# Patient Record
Sex: FEMALE | Race: WHITE | Employment: OTHER | URBAN - METROPOLITAN AREA
[De-identification: names, ages, dates, MRNs, and addresses within clinical notes are randomized per-mention and may not be internally consistent; named-entity substitution may affect disease eponyms.]

---

## 2017-05-10 PROBLEM — I10 ESSENTIAL HYPERTENSION: Status: ACTIVE | Noted: 2017-05-10

## 2017-05-24 ENCOUNTER — HOSPITAL ENCOUNTER (OUTPATIENT)
Dept: CT IMAGING | Age: 72
Discharge: HOME | End: 2017-05-24
Attending: NURSE PRACTITIONER
Payer: MEDICARE

## 2017-05-24 DIAGNOSIS — R91.1 PULMONARY NODULE: ICD-10-CM

## 2017-05-24 PROCEDURE — 71250 CT THORAX DX C-: CPT

## 2017-07-20 ENCOUNTER — HOSPITAL ENCOUNTER (OUTPATIENT)
Age: 72
Setting detail: OUTPATIENT SURGERY
Discharge: HOME | End: 2017-07-20
Attending: INTERNAL MEDICINE | Admitting: INTERNAL MEDICINE
Payer: MEDICARE

## 2017-07-20 ENCOUNTER — APPOINTMENT (OUTPATIENT)
Dept: GENERAL RADIOLOGY | Age: 72
End: 2017-07-20
Attending: INTERNAL MEDICINE
Payer: MEDICARE

## 2017-07-20 ENCOUNTER — APPOINTMENT (OUTPATIENT)
Dept: CT IMAGING | Age: 72
End: 2017-07-20
Attending: INTERNAL MEDICINE
Payer: MEDICARE

## 2017-07-20 VITALS
RESPIRATION RATE: 16 BRPM | OXYGEN SATURATION: 94 % | DIASTOLIC BLOOD PRESSURE: 73 MMHG | HEART RATE: 65 BPM | SYSTOLIC BLOOD PRESSURE: 134 MMHG

## 2017-07-20 DIAGNOSIS — R91.8 LUNG MASS: ICD-10-CM

## 2017-07-20 DIAGNOSIS — Z87.891 HISTORY OF TOBACCO ABUSE: ICD-10-CM

## 2017-07-20 DIAGNOSIS — C34.11 CANCER OF UPPER LOBE OF RIGHT LUNG (HCC): ICD-10-CM

## 2017-07-20 PROCEDURE — 31654 BRONCH EBUS IVNTJ PERPH LES: CPT | Performed by: INTERNAL MEDICINE

## 2017-07-20 PROCEDURE — 88305 TISSUE EXAM BY PATHOLOGIST: CPT | Performed by: INTERNAL MEDICINE

## 2017-07-20 PROCEDURE — 76040000026: Performed by: INTERNAL MEDICINE

## 2017-07-20 PROCEDURE — 88112 CYTOPATH CELL ENHANCE TECH: CPT | Performed by: INTERNAL MEDICINE

## 2017-07-20 PROCEDURE — 99152 MOD SED SAME PHYS/QHP 5/>YRS: CPT | Performed by: INTERNAL MEDICINE

## 2017-07-20 PROCEDURE — 99204 OFFICE O/P NEW MOD 45 MIN: CPT | Performed by: INTERNAL MEDICINE

## 2017-07-20 PROCEDURE — 74011250636 HC RX REV CODE- 250/636: Performed by: INTERNAL MEDICINE

## 2017-07-20 PROCEDURE — 77030028571 HC CATH ENDOBRNCH DISP BIOP KT SPMD -G1: Performed by: INTERNAL MEDICINE

## 2017-07-20 PROCEDURE — 77030021922 HC FCPS BIOP SD DISP SPDM -D: Performed by: INTERNAL MEDICINE

## 2017-07-20 PROCEDURE — 77030031404 HC PTCH SENS SD DISP SPDM -A: Performed by: INTERNAL MEDICINE

## 2017-07-20 PROCEDURE — 31624 DX BRONCHOSCOPE/LAVAGE: CPT | Performed by: INTERNAL MEDICINE

## 2017-07-20 PROCEDURE — 77030009046 HC CATH BRNCH BLLN OCOA -B: Performed by: INTERNAL MEDICINE

## 2017-07-20 PROCEDURE — 74011000250 HC RX REV CODE- 250: Performed by: INTERNAL MEDICINE

## 2017-07-20 PROCEDURE — 77030003406 HC NDL ASPIR BIOP OCOA -C: Performed by: INTERNAL MEDICINE

## 2017-07-20 PROCEDURE — 71250 CT THORAX DX C-: CPT

## 2017-07-20 PROCEDURE — 88177 CYTP FNA EVAL EA ADDL: CPT | Performed by: INTERNAL MEDICINE

## 2017-07-20 PROCEDURE — 74011250636 HC RX REV CODE- 250/636

## 2017-07-20 PROCEDURE — 99153 MOD SED SAME PHYS/QHP EA: CPT | Performed by: INTERNAL MEDICINE

## 2017-07-20 PROCEDURE — 31628 BRONCHOSCOPY/LUNG BX EACH: CPT | Performed by: INTERNAL MEDICINE

## 2017-07-20 PROCEDURE — 77030012699 HC VLV SUC CNTRL OCOA -A: Performed by: INTERNAL MEDICINE

## 2017-07-20 PROCEDURE — 88173 CYTOPATH EVAL FNA REPORT: CPT | Performed by: INTERNAL MEDICINE

## 2017-07-20 PROCEDURE — 31627 NAVIGATIONAL BRONCHOSCOPY: CPT | Performed by: INTERNAL MEDICINE

## 2017-07-20 PROCEDURE — 88172 CYTP DX EVAL FNA 1ST EA SITE: CPT | Performed by: INTERNAL MEDICINE

## 2017-07-20 RX ORDER — SODIUM CHLORIDE 9 MG/ML
25 INJECTION, SOLUTION INTRAVENOUS CONTINUOUS
Status: DISCONTINUED | OUTPATIENT
Start: 2017-07-20 | End: 2017-07-20 | Stop reason: HOSPADM

## 2017-07-20 RX ORDER — FENTANYL CITRATE 50 UG/ML
50 INJECTION, SOLUTION INTRAMUSCULAR; INTRAVENOUS
Status: DISCONTINUED | OUTPATIENT
Start: 2017-07-20 | End: 2017-07-20 | Stop reason: HOSPADM

## 2017-07-20 RX ORDER — LIDOCAINE HYDROCHLORIDE 20 MG/ML
JELLY TOPICAL AS NEEDED
Status: DISCONTINUED | OUTPATIENT
Start: 2017-07-20 | End: 2017-07-20 | Stop reason: HOSPADM

## 2017-07-20 RX ORDER — LIDOCAINE HYDROCHLORIDE 40 MG/ML
SOLUTION TOPICAL AS NEEDED
Status: DISCONTINUED | OUTPATIENT
Start: 2017-07-20 | End: 2017-07-20 | Stop reason: HOSPADM

## 2017-07-20 RX ORDER — SODIUM CHLORIDE 0.9 % (FLUSH) 0.9 %
5-10 SYRINGE (ML) INJECTION EVERY 8 HOURS
Status: DISCONTINUED | OUTPATIENT
Start: 2017-07-20 | End: 2017-07-20 | Stop reason: HOSPADM

## 2017-07-20 RX ORDER — SODIUM CHLORIDE 0.9 % (FLUSH) 0.9 %
5-10 SYRINGE (ML) INJECTION AS NEEDED
Status: DISCONTINUED | OUTPATIENT
Start: 2017-07-20 | End: 2017-07-20 | Stop reason: HOSPADM

## 2017-07-20 RX ORDER — MIDAZOLAM HYDROCHLORIDE 1 MG/ML
.25-5 INJECTION, SOLUTION INTRAMUSCULAR; INTRAVENOUS
Status: DISCONTINUED | OUTPATIENT
Start: 2017-07-20 | End: 2017-07-20 | Stop reason: HOSPADM

## 2017-07-20 RX ADMIN — MIDAZOLAM HYDROCHLORIDE 1 MG: 1 INJECTION, SOLUTION INTRAMUSCULAR; INTRAVENOUS at 10:17

## 2017-07-20 RX ADMIN — FENTANYL CITRATE 50 MCG: 50 INJECTION, SOLUTION INTRAMUSCULAR; INTRAVENOUS at 10:47

## 2017-07-20 RX ADMIN — FENTANYL CITRATE 50 MCG: 50 INJECTION, SOLUTION INTRAMUSCULAR; INTRAVENOUS at 10:07

## 2017-07-20 RX ADMIN — LIDOCAINE HYDROCHLORIDE 7 ML: 40 SOLUTION TOPICAL at 10:14

## 2017-07-20 RX ADMIN — SODIUM CHLORIDE 25 ML/HR: 900 INJECTION, SOLUTION INTRAVENOUS at 10:09

## 2017-07-20 RX ADMIN — FENTANYL CITRATE 50 MCG: 50 INJECTION, SOLUTION INTRAMUSCULAR; INTRAVENOUS at 10:21

## 2017-07-20 RX ADMIN — MIDAZOLAM HYDROCHLORIDE 1 MG: 1 INJECTION, SOLUTION INTRAMUSCULAR; INTRAVENOUS at 10:11

## 2017-07-20 RX ADMIN — MIDAZOLAM HYDROCHLORIDE 2 MG: 1 INJECTION, SOLUTION INTRAMUSCULAR; INTRAVENOUS at 10:07

## 2017-07-20 RX ADMIN — FENTANYL CITRATE 50 MCG: 50 INJECTION, SOLUTION INTRAMUSCULAR; INTRAVENOUS at 10:10

## 2017-07-20 RX ADMIN — LIDOCAINE HYDROCHLORIDE 1 ML: 20 JELLY TOPICAL at 10:14

## 2017-07-20 RX ADMIN — FENTANYL CITRATE 50 MCG: 50 INJECTION, SOLUTION INTRAMUSCULAR; INTRAVENOUS at 10:16

## 2017-07-20 NOTE — H&P
HISTORY AND PHYSICAL      Antoni Bella    7/20/2017    Date of Admission:  7/20/2017    The patient's chart is reviewed and the patient is discussed with the staff. Subjective:     Patient is a 66 y.o.  female presents for a new patient evaluation for lung mass. She states for the last few months she has noticed increased TAO, particularly when walking uphill. She eventually informed her PCP who performed a CXR followed by CT chest. This revealed a 3.7 x 1.7 mass in the RUL. She reports some associated dry cough that she describes as a tickle in her throat. This is not productive and she denies any hemoptysis, fever, chills, chest pain, wheeze, night sweats, or unintentional weight loss. Her only history of cancer is a skin cancer removed from her forehead (she cannot remember which type). She is a brief former smoker, smoking about 1/2 ppd for 5 years and quit 20 years ago. Review of Systems  A comprehensive review of systems was negative except for that written in the HPI. Patient Active Problem List   Diagnosis Code    Prediabetes R73.03    Acquired hypothyroidism E03.9    Hypertension I10    Pure hypercholesterolemia E78.00    Urinary frequency R35.0    Generalized osteoarthrosis, involving multiple sites M15.9    Basal cell carcinoma C44.91    Fibromyalgia M79.7    Rheumatoid arthritis ([facility] [address] M06.9    Gastroesophageal reflux disease without esophagitis K21.9    Essential hypertension I10    Lung mass R91.8       Prior to Admission Medications   Prescriptions Last Dose Informant Patient Reported? Taking? ASCORBIC ACID/COLLAGEN HYDR (COLLAGEN PLUS VITAMIN C PO) 7/19/2017 at Unknown time  Yes Yes   Sig: Take 6 Tabs by mouth daily. BIOTIN PO 7/19/2017 at Unknown time  Yes Yes   Sig: Take  by mouth. 5,000mcg   CALCIUM CARBONATE/VITAMIN D3 (CALTRATE 600 + D PO) 7/19/2017 at Unknown time  Yes Yes   Sig: Take  by mouth.  With Magnesium and Zinc also DULoxetine (CYMBALTA) 60 mg capsule 7/19/2017 at Unknown time  No Yes   Sig: Take 1 Cap by mouth daily. GLUCOSAMINE HCL/CHONDR BIRD A NA (OSTEO BI-FLEX PO) 7/19/2017 at Unknown time  Yes Yes   Sig: Take  by mouth. LACTOBACILLUS ACIDOPHILUS (PROBIOTIC PO) 7/19/2017 at Unknown time  Yes Yes   Sig: Take  by mouth. OTHER 6/20/2017 at Unknown time  Yes Yes   Sig: Go Hermila Go powder taken once a day   celecoxib (CELEBREX) 200 mg capsule 7/19/2017 at Unknown time  No Yes   Sig: Take 1 Cap by mouth two (2) times a day. hydroCHLOROthiazide (HYDRODIURIL) 25 mg tablet 7/20/2017 at Unknown time  No Yes   Sig: Take 1 Tab by mouth daily. levothyroxine (SYNTHROID) 50 mcg tablet 7/19/2017 at Unknown time  No Yes   Sig: TAKE 1 TABLET EVERY DAY  BEFORE  BREAKFAST   losartan (COZAAR) 100 mg tablet 7/20/2017 at Unknown time  No Yes   Sig: TAKE ONE TABLET BY MOUTH EVERY DAY   multivitamins chew 7/19/2017 at Unknown time  Yes Yes   Sig: Take 1 Tab by mouth daily. omeprazole (PRILOSEC) 40 mg capsule 7/19/2017 at Unknown time  No Yes   Sig: Take 1 Cap by mouth daily. Indications: HEARTBURN   raNITIdine (ZANTAC) 300 mg tablet 7/19/2017 at Unknown time  No Yes   Sig: Take 1 Tab by mouth nightly. Indications: HEARTBURN   tolterodine ER (DETROL LA) 4 mg ER capsule 7/13/2017 at Unknown time  No Yes   Sig: Take 1 Cap by mouth daily. Indications: INCREASED URINARY FREQUENCY   verapamil ER (VERELAN) 180 mg CR capsule Unknown at Unknown time  No No   Sig: Take 1 Cap by mouth daily.  For Hypertension in addition to Metoprolol XL and Losartan      Facility-Administered Medications: None       Past Medical History:   Diagnosis Date    Basal cell carcinoma 2001    COPD     Esophageal reflux     Fibromyalgia     Gastroesophageal reflux disease without esophagitis 8/10/2016    Generalized osteoarthrosis, involving multiple sites     Hypertension     Obstructive chronic bronchitis without exacerbation (HCC)     Other abnormal glucose HgbA1C 5.9 in 5/13    Other and unspecified special symptom or syndrome, not elsewhere classified 2001    intestinal blockage    Pain in joint, multiple sites     Prediabetes     HgbA1C 5.9 in 5/13    Pure hypercholesterolemia     Unspecified hypothyroidism 5/13    Urinary frequency      Past Surgical History:   Procedure Laterality Date    BREAST SURGERY PROCEDURE UNLISTED  8/04    left breast cyst aspiration/stereotactic core biopsy, fibroadenoma (benign)    ENDOSCOPY, COLON, DIAGNOSTIC  8/08    HX ACL RECONSTRUCTION      ACL repair right knee    HX BREAST BIOPSY Left     HX BUNIONECTOMY      bilateral    HX CATARACT REMOVAL      bilateral    HX CYST REMOVAL      ganglion    HX ORTHOPAEDIC      bunion surgery-bilateral    HX ORTHOPAEDIC  11/4/2016    right hip    HX OTHER SURGICAL  2002    face lift    HX OTHER SURGICAL      varicose veins surgery x2    TOTAL KNEE ARTHROPLASTY  10/05    right     Social History     Social History    Marital status:      Spouse name: N/A    Number of children: N/A    Years of education: N/A     Occupational History    Not on file.      Social History Main Topics    Smoking status: Former Smoker     Packs/day: 0.50     Years: 5.00     Quit date: 1/1/1990    Smokeless tobacco: Never Used    Alcohol use 1.2 oz/week     2 Glasses of wine per week      Comment: light, drinks wine    Drug use: No    Sexual activity: Not on file     Other Topics Concern    Not on file     Social History Narrative     Family History   Problem Relation Age of Onset    Cancer Mother     Coronary Artery Disease Father      MI at 72    Cancer Father      lung    Heart Disease Father     Hypertension Sister     Arthritis-osteo Other     Breast Cancer Neg Hx      Allergies   Allergen Reactions    Lisinopril Cough       Current Facility-Administered Medications   Medication Dose Route Frequency    lidocaine (XYLOCAINE) 4 % (40 mg/mL) topical solution   Topical PRN  lidocaine (XYLOCAINE) 2 % jelly   Mucous Membrane PRN    promethazine (PHENERGAN) with saline injection 12.5 mg  12.5 mg IntraVENous ONCE    sodium chloride (NS) flush 5-10 mL  5-10 mL IntraVENous Q8H    sodium chloride (NS) flush 5-10 mL  5-10 mL IntraVENous PRN    0.9% sodium chloride infusion  25 mL/hr IntraVENous CONTINUOUS    midazolam (VERSED) injection 0.25-5 mg  0.25-5 mg IntraVENous Multiple    fentaNYL citrate (PF) injection 50 mcg  50 mcg IntraVENous Multiple           Objective:     Vitals:    07/20/17 0959   BP: 161/71   Pulse: 66   Resp: 15   SpO2: 99%       PHYSICAL EXAM     Constitutional:  the patient is well developed and in no acute distress  EENMT:  Sclera clear, pupils equal, oral mucosa moist  Respiratory: CTA B, no w/r/r  Cardiovascular:  RRR without M,G,R  Gastrointestinal: soft and non-tender; with positive bowel sounds. Musculoskeletal: warm without cyanosis. There is no lower leg edema. Skin:  no jaundice or rashes, no wounds   Neurologic: no gross neuro deficits     Psychiatric:  alert and oriented x ppt    CT Chest:  7/20/17      No results for input(s): WBC, HGB, HCT, PLT, INR, HGBEXT, HCTEXT, PLTEXT, HGBEXT, HCTEXT, PLTEXT in the last 72 hours. No lab exists for component: INREXT, INREXT  No results for input(s): NA, K, CL, GLU, CO2, BUN, CREA, MG, PHOS, CA, TROIQ, ALB, TBIL, TBILI, GPT, ALT, SGOT, BNPP in the last 72 hours. No lab exists for component: TROIP  No results for input(s): PH, PCO2, PO2, HCO3 in the last 72 hours. No results for input(s): LCAD, LAC in the last 72 hours. Assessment:  (Medical Decision Making)     Hospital Problems  Date Reviewed: 7/10/2017          Codes Class Noted POA    Lung mass ICD-10-CM: R91.8  ICD-9-CM: 786.6  7/20/2017 Unknown            New patient here for evaluation of lung mass. Seen for the first time in the [address] [place] [address] lab. Discussed with her that this could represent benign process but also malignancy.  No old results for comparison. Given its size, it warrants attempt at biopsy. Plan:  (Medical Decision Making)     --Will proceed with planned biopsy via navigation bronchoscopy  -patient will need follow up in our office with PFT's as well. More than 50% of the time documented was spent in face-to-face contact with the patient and in the care of the patient on the floor/unit where the patient is located.     Annemarie Saleem MD

## 2017-07-20 NOTE — DISCHARGE INSTRUCTIONS
RESPIRATORY CARE - BRONCHOSCOPY - DISCHARGE INSTRUCTIONS      You received a lot of numbing medication for your throat and nose, and you also received medication to make you sleepy during your procedure. Because of this and because the bronchoscopy may have irritated your airways, we ask that you follow these directions:    1. Do not eat or drink until  1 pm .   After that, you may have what you please. You may want to try some liquids first, because your throat may be a little sore. 2. Medication may cause drowsiness for several hours, therefore, do not drive or                  operate machinery for remainder of the day. No alcohol today. 3. You may cough up more mucus than usual and you may see some blood, but                   this is expected and should subside by the following day. 4. If severe throat irritation, coughing, or bleeding continue, call your doctor. 5.         If you run a fever greater than 102, call [facility] at [phone]. 6.         Dr. Priyanka Tamayo has asked that you:                A. Call the doctor's office at [phone] for problems. Mary Sexton at [facility] is scheduling you an appointment to see Dr Macho talley in 2-4 weeks and a Pulmonary Function Test.  You have been referred to oncology at [phone]. They are working on an appointment for you to see an oncologist and to have a PET scan in no sooner than 10 days from today. The procedure today could affect the results of this scan. Discharge Medications:         Any additional instructions:  Motrin or tylenol for fever.     Instructions given to Lucianofabio Angie and other family members  Instructions given by:  Libia Aguero RT

## 2017-07-20 NOTE — IP AVS SNAPSHOT
[address] [facility] 
 
 
 [address] [facility] [address] [facility] 
[phone] Patient: Pilar Hodge MRN: [id number] CYL:[birth year] You are allergic to the following Allergen Reactions Lisinopril Cough Recent Documentation Breastfeeding? 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  
[facility] MEDICINE ([address] [facility]) [address]  
[phone] Current Discharge Medication List  
  
ASK your doctor about these medications Dose & Instructions Dispensing Information Comments Morning Noon Evening Bedtime BIOTIN PO Your last dose was: Your next dose is: Take  by mouth. 5,000mcg Refills:  0  
     
   
   
   
  
 CALTRATE 600 + D PO Your last dose was: Your next dose is: Take  by mouth. With Magnesium and Zinc also Refills:  0  
     
   
   
   
  
 celecoxib 200 mg capsule Commonly known as:  CELEBREX Your last dose was: Your next dose is:    
   
   
 Dose:  200 mg Take 1 Cap by mouth two (2) times a day. Quantity:  180 Cap Refills:  5 COLLAGEN PLUS VITAMIN C PO Your last dose was: Your next dose is:    
   
   
 Dose:  6 Tab Take 6 Tabs by mouth daily. Refills:  0 DULoxetine 60 mg capsule Commonly known as:  CYMBALTA Your last dose was: Your next dose is:    
   
   
 Dose:  60 mg Take 1 Cap by mouth daily. Quantity:  90 Cap Refills:  1  
     
   
   
   
  
 hydroCHLOROthiazide 25 mg tablet Commonly known as:  HYDRODIURIL Your last dose was: Your next dose is:    
   
   
 Dose:  25 mg Take 1 Tab by mouth daily. Quantity:  30 Tab Refills:  3  
     
   
   
   
  
 levothyroxine 50 mcg tablet Commonly known as:  SYNTHROID Your last dose was: Your next dose is: TAKE 1 TABLET EVERY DAY  BEFORE  BREAKFAST Quantity:  90 Tab Refills:  1  
     
   
   
   
  
 losartan 100 mg tablet Commonly known as:  COZAAR Your last dose was: Your next dose is: TAKE ONE TABLET BY MOUTH EVERY DAY Quantity:  90 Tab Refills:  1  
     
   
   
   
  
 multivitamins Chew Your last dose was: Your next dose is:    
   
   
 Dose:  1 Tab Take 1 Tab by mouth daily. Refills:  0  
     
   
   
   
  
 omeprazole 40 mg capsule Commonly known as:  PRILOSEC Your last dose was: Your next dose is:    
   
   
 Dose:  40 mg Take 1 Cap by mouth daily. Indications: HEARTBURN Quantity:  90 Cap Refills:  1 OSTEO BI-FLEX PO Your last dose was: Your next dose is: Take  by mouth. Refills:  0  
     
   
   
   
  
 OTHER Your last dose was: Your next dose is:    
   
   
 Go Hermila Go powder taken once a day Refills:  0 PROBIOTIC PO Your last dose was: Your next dose is: Take  by mouth. Refills:  0  
     
   
   
   
  
 raNITIdine 300 mg tablet Commonly known as:  ZANTAC Your last dose was: Your next dose is:    
   
   
 Dose:  300 mg Take 1 Tab by mouth nightly. Indications: HEARTBURN Quantity:  90 Tab Refills:  1  
     
   
   
   
  
 tolterodine ER 4 mg ER capsule Commonly known as:  Tracie Economy Your last dose was: Your next dose is:    
   
   
 Dose:  4 mg Take 1 Cap by mouth daily. Indications: INCREASED URINARY FREQUENCY Quantity:  90 Cap Refills:  1  
     
   
   
   
  
 verapamil  mg CR capsule Commonly known as:  Earlean Valentina Your last dose was: Your next dose is:    
   
   
 Dose:  180 mg Take 1 Cap by mouth daily. For Hypertension in addition to Metoprolol XL and Losartan Quantity:  90 Cap Refills:  1 Discharge Instructions RESPIRATORY CARE - BRONCHOSCOPY - DISCHARGE INSTRUCTIONS You received a lot of numbing medication for your throat and nose, and you also received medication to make you sleepy during your procedure. Because of this and because the bronchoscopy may have irritated your airways, we ask that you follow these directions: 1. Do not eat or drink until  1 pm .   After that, you may have what you please. You may want to try some liquids first, because your throat may be a little sore. 2. Medication may cause drowsiness for several hours, therefore, do not drive or                  operate machinery for remainder of the day. No alcohol today. 3. You may cough up more mucus than usual and you may see some blood, but                   this is expected and should subside by the following day. 4. If severe throat irritation, coughing, or bleeding continue, call your doctor. 5.         If you run a fever greater than 102, call [facility] at [phone]. 6.         Dr. DOMINGUEZ [facility] has asked that you: A. Call the doctor's office at [phone] for problems. Angelina Neves at [facility] is scheduling you an appointment to see Dr Noel talley in 2-4 weeks and a Pulmonary Function Test.  You have been referred to oncology at [phone]. They are working on an appointment for you to see an oncologist and to have a PET scan in no sooner than 10 days from today. The procedure today could affect the results of this scan. Discharge Medications: 
 
  
 
Any additional instructions:  Motrin or tylenol for fever. Instructions given to Ulysses Richards and other family members Instructions given by:  Anant Blanco, RT Discharge Orders None Introducing [facility]! Dear Anu Locke: Thank you for requesting a [employer] account. Our records indicate that you already have an active [employer] account. You can access your account anytime at [email or web address] Did you know that you can access your hospital and ER discharge instructions at any time in [employer]? You can also review all of your test results from your hospital stay or ER visit. Additional Information If you have questions, please visit the Frequently Asked Questions section of the [employer] website at [email or web address] Remember, [employer] is NOT to be used for urgent needs. For medical emergencies, dial 911. Now available from your iPhone and Android! General Information Please provide this summary of care documentation to your next provider. Patient Signature:  ____________________________________________________________  Date:  ____________________________________________________________  
  
Ellen Tejada    
 Provider Signature:  ____________________________________________________________ Date:  ____________________________________________________________

## 2017-07-20 NOTE — PROCEDURES
PROCEDURE  Bronchoscopy with endobronchial ultrasound guided fine needle aspiration of mediastinal lymph nodes and airway inspection and EMN aided biopsy of peripheral lung mass. EQUIPEMENT  Olympus T180 bronchoscope  Super Dimension EMN system  90 deg stearable sheath  Olympus [id number] EBUS scope  UM 17/20 Radial probe  Super D forceps    INDICATION   Peripheral mass suspicious for malignancy/staging of presumed malignancy    IMAGING        POST OP DIAGNOSIS:  Super Dimension EMN system was employed to identify and biopsy the RUL mass seen on CT.  6 biopsies and mini BAL were performed with touch preparations revealing malignancy on ZENOBIA. Histology from obtained biopsies is pending. Following EMN procedure, concave EBUS was performed for mediastinal staging. Station 7 was biopsied and was negative for malignancy on ZENOBIA. Based on imaging and biopsies, pt is a STAGE V5dL2Wd (stage IB) non-small cell lung cancer. ANESTHESIA  Concious sedation with: Fentanyl 250 mcg IV; Versed 4 mg IV; Lidocaine 220 mg to tracheo-bronchial tree and vocal cords. AIRWAY INSPECTION  After obtaining informed consent, using a bite block, an Olympus Q 180 viedeo bronchoscope was  introduced into the trachea through the vocal cords without complication. RIGHT  LOCATION NORM/ABNORMAL DESCRIPTION   VOCAL CORDS NL    TRACHEA NL    JANESSA NL    RMSB NL    RUL NL    BI NL    RML NL    RLL NL    SUP SEGM RLL NL    MED BASAL NL    ANTERIOR BASAL NL    LATERAL BASAL NL    POSTERIOR BASAL NL            LEFT  LOCATION NORMAL/ABNORMAL TYPE   LMSB NL    SAVANNA NL    LINGULA NL    LLL NL    SUPERIOR SEG LLL NL    LUIGI-MEDIAL LLL NL    LATERAL LLL NL    POSTERIOR LLL NL               EBUS  After completing the airway inspection an Olympus  F EBUS bronchoscope was introduced into the trachea through the vocal chords without complication.   The balloon was inflated with saline and a mediastinal inspection commenced:      STATION SIZE IN CM   11R inf No target   11R sup No target   10R No target   4R No target   2R No target   7 8mm   2L No target   4L No target   10L No target   11L No target         After identifying targets the following samples were obtained:    STATION PASS# LYMPHOCYTES ATYPIA GRANULOMA DIAGNOSIS   7 1 + - - -    2 + - - -    3 blood - - -            Navigation    Olympus T 180 bronchoscope was introduced into the airways to identify and biopsy the RUL mass with the aid of Super D EMN system and radial probe US. CT images acquired with Super D protocol were used to plan the pathway to target lesion planned using Super D software. Planning data was then used to navigate to the nodule, with verification of location using radial US. Visibility with radial US was: good  6 biopsies were obtained and evaluated via touch prep and ZENOBIA. Touch preps revealed malignancy. Histology from obtained tissue is currently pending. TOUCH PREP BIOPSY# MALIGNANT ATYPIA/SUSPICIOUS DIAGNOSIS   RUL 1 - +     2 +  +    3 +  +    4 +  +    5 formalin      6 formalin       EBL: <08SQ    Complications: None    Diagnosis:   Likely non-small cell lung cancer. Presently stage IB    Plan: Will need outpatient pulmonary follow up, cPFT's, PET scan, and oncology referral.   Will discuss at tumor board once all this is back. Will need to talk with radiology about any signs of the mass crossing over the fissure and if no sign of distant disease discuss surgical resection vs SBRT.        Nemesio Crawford MD

## 2017-07-20 NOTE — ROUTINE PROCESS
Discussed navigational bronchoscopy findings with Nunu Piedra RN. She will arrange PET and appointment for patient to be seen by oncology.

## 2017-07-20 NOTE — ROUTINE PROCESS
Pt. Discharged to car by wheelchair with Conchita Ross . Vital signs stable. Able to tolerate PO fluids. Passing gas.  Seen by MD.

## 2017-08-04 ENCOUNTER — HOSPITAL ENCOUNTER (OUTPATIENT)
Dept: PET IMAGING | Age: 72
Discharge: HOME | End: 2017-08-04
Payer: MEDICARE

## 2017-08-04 VITALS — BODY MASS INDEX: 28.93 KG/M2 | WEIGHT: 180 LBS | HEIGHT: 66 IN

## 2017-08-04 DIAGNOSIS — C34.11 CANCER OF UPPER LOBE OF RIGHT LUNG (HCC): ICD-10-CM

## 2017-08-04 PROCEDURE — 74011636320 HC RX REV CODE- 636/320: Performed by: INTERNAL MEDICINE

## 2017-08-04 PROCEDURE — 78815 PET IMAGE W/CT SKULL-THIGH: CPT

## 2017-08-04 RX ORDER — SODIUM CHLORIDE 0.9 % (FLUSH) 0.9 %
10 SYRINGE (ML) INJECTION
Status: COMPLETED | OUTPATIENT
Start: 2017-08-04 | End: 2017-08-04

## 2017-08-04 RX ADMIN — Medication 10 ML: at 08:32

## 2017-08-04 RX ADMIN — DIATRIZOATE MEGLUMINE AND DIATRIZOATE SODIUM 10 ML: 660; 100 LIQUID ORAL; RECTAL at 08:32

## 2017-08-09 NOTE — PROGRESS NOTES
Discussed with the patient. She is in agreement with proceeding with referral to surgery and oncology. Referrals have been placed.

## 2017-08-24 ENCOUNTER — HOSPITAL ENCOUNTER (OUTPATIENT)
Dept: MRI IMAGING | Age: 72
Discharge: HOME | End: 2017-08-24
Attending: INTERNAL MEDICINE
Payer: MEDICARE

## 2017-08-24 DIAGNOSIS — C34.11 CANCER OF UPPER LOBE OF RIGHT LUNG (HCC): ICD-10-CM

## 2017-08-24 LAB — CREAT BLD-MCNC: 1.1 MG/DL (ref 0.6–1)

## 2017-08-24 PROCEDURE — 82565 ASSAY OF CREATININE: CPT

## 2017-08-24 PROCEDURE — 70553 MRI BRAIN STEM W/O & W/DYE: CPT

## 2017-08-24 PROCEDURE — 74011250636 HC RX REV CODE- 250/636: Performed by: INTERNAL MEDICINE

## 2017-08-24 PROCEDURE — A9577 INJ MULTIHANCE: HCPCS | Performed by: INTERNAL MEDICINE

## 2017-08-24 RX ORDER — SODIUM CHLORIDE 0.9 % (FLUSH) 0.9 %
10 SYRINGE (ML) INJECTION
Status: COMPLETED | OUTPATIENT
Start: 2017-08-24 | End: 2017-08-24

## 2017-08-24 RX ADMIN — Medication 10 ML: at 08:33

## 2017-08-24 RX ADMIN — GADOBENATE DIMEGLUMINE 17 ML: 529 INJECTION, SOLUTION INTRAVENOUS at 08:32

## 2017-08-29 RX ORDER — LEVOFLOXACIN 5 MG/ML
500 INJECTION, SOLUTION INTRAVENOUS EVERY 24 HOURS
Status: CANCELLED | OUTPATIENT
Start: 2017-09-25

## 2017-09-05 ENCOUNTER — HOSPITAL ENCOUNTER (OUTPATIENT)
Dept: MAMMOGRAPHY | Age: 72
Discharge: HOME | End: 2017-09-05
Attending: NURSE PRACTITIONER
Payer: MEDICARE

## 2017-09-05 DIAGNOSIS — Z12.31 VISIT FOR SCREENING MAMMOGRAM: ICD-10-CM

## 2017-09-05 PROCEDURE — 77067 SCR MAMMO BI INCL CAD: CPT

## 2017-09-14 ENCOUNTER — HOSPITAL ENCOUNTER (OUTPATIENT)
Dept: LAB | Age: 72
Discharge: HOME | End: 2017-09-14

## 2017-09-14 PROCEDURE — 88305 TISSUE EXAM BY PATHOLOGIST: CPT | Performed by: INTERNAL MEDICINE

## 2017-09-18 ENCOUNTER — HOSPITAL ENCOUNTER (OUTPATIENT)
Dept: SURGERY | Age: 72
Discharge: HOME | End: 2017-09-18
Payer: MEDICARE

## 2017-09-18 ENCOUNTER — ANESTHESIA EVENT (OUTPATIENT)
Dept: SURGERY | Age: 72
DRG: 165 | End: 2017-09-18
Payer: MEDICARE

## 2017-09-18 VITALS
BODY MASS INDEX: 29.51 KG/M2 | HEIGHT: 67 IN | TEMPERATURE: 98.3 F | HEART RATE: 66 BPM | OXYGEN SATURATION: 99 % | WEIGHT: 188 LBS | RESPIRATION RATE: 16 BRPM

## 2017-09-18 LAB
ANION GAP SERPL CALC-SCNC: 7 MMOL/L (ref 7–16)
BUN SERPL-MCNC: 26 MG/DL (ref 8–23)
CALCIUM SERPL-MCNC: 8.8 MG/DL (ref 8.3–10.4)
CHLORIDE SERPL-SCNC: 101 MMOL/L (ref 98–107)
CO2 SERPL-SCNC: 27 MMOL/L (ref 21–32)
CREAT SERPL-MCNC: 1.01 MG/DL (ref 0.6–1)
ERYTHROCYTE [DISTWIDTH] IN BLOOD BY AUTOMATED COUNT: 13.5 % (ref 11.9–14.6)
GLUCOSE SERPL-MCNC: 104 MG/DL (ref 65–100)
HCT VFR BLD AUTO: 36.1 % (ref 35.8–46.3)
HGB BLD-MCNC: 11.9 G/DL (ref 11.7–15.4)
MCH RBC QN AUTO: 29.8 PG (ref 26.1–32.9)
MCHC RBC AUTO-ENTMCNC: 33 G/DL (ref 31.4–35)
MCV RBC AUTO: 90.5 FL (ref 79.6–97.8)
PLATELET # BLD AUTO: 291 K/UL (ref 150–450)
PMV BLD AUTO: 9.6 FL (ref 10.8–14.1)
POTASSIUM SERPL-SCNC: 4.9 MMOL/L (ref 3.5–5.1)
RBC # BLD AUTO: 3.99 M/UL (ref 4.05–5.25)
SODIUM SERPL-SCNC: 135 MMOL/L (ref 136–145)
WBC # BLD AUTO: 5.3 K/UL (ref 4.3–11.1)

## 2017-09-18 PROCEDURE — 85027 COMPLETE CBC AUTOMATED: CPT | Performed by: ANESTHESIOLOGY

## 2017-09-18 PROCEDURE — 80048 BASIC METABOLIC PNL TOTAL CA: CPT | Performed by: ANESTHESIOLOGY

## 2017-09-18 NOTE — PERIOP NOTES
Patient verified name, , and surgery as listed in [facility]. Patient provided medical/health information and PTA medications to the best of their ability. TYPE  CASE:lll  Orders per surgeon: were Received  Labs per surgeon:none. Labs per anesthesia protocol: cbc, bmp and T&S on arrival.    EKG  :  Ekg and stress test from 2017 on chart, patient denies any hx of chest pain,   or CAD    Patient provided with and instructed on education handouts including Guide to Surgery, blood transfusions, pain management, and hand hygiene for the family and community, and [facility] brochure. With antibacterial soap and hibiclens and instructions given per hospital policy. Instructed patient to continue previous medications as prescribed prior to surgery unless otherwise directed and to take the following medications the day of surgery according to anesthesia guidelines : levothyroxine, and omeprazole, and verapamil . Instructed patient to hold  the following medications: all vitamins and celebrex. Original medication prescription bottles were not visualized visualized during patient appointment. Patient brought a list    Patient teach back successful and patient demonstrates knowledge of instruction.

## 2017-09-18 NOTE — ANESTHESIA PREPROCEDURE EVALUATION
Anesthetic History   No history of anesthetic complications            Review of Systems / Medical History  Patient summary reviewed, nursing notes reviewed and pertinent labs reviewed    Pulmonary    COPD: mild            Comments: Lung CA   Neuro/Psych   Within defined limits           Cardiovascular    Hypertension: well controlled              Exercise tolerance: >4 METS  Comments: Nuclear perfusion study 7/23/57:  Normal systolic function; normal perfusion; EF 71%   GI/Hepatic/Renal     GERD: well controlled           Endo/Other    Diabetes: well controlled, type 2  Hypothyroidism: well controlled  Arthritis     Other Findings   Comments: Fibromyalgia    Former Smoker 1990's         Physical Exam    Airway  Mallampati: II  TM Distance: 4 - 6 cm  Neck ROM: normal range of motion   Mouth opening: Normal     Cardiovascular    Rhythm: regular  Rate: normal         Dental  No notable dental hx       Pulmonary  Breath sounds clear to auscultation               Abdominal  GI exam deferred       Other Findings            Anesthetic Plan    ASA: 3  Anesthesia type: general    Monitoring Plan: Arterial line        Anesthetic plan and risks discussed with: Patient    Son and daughter present

## 2017-09-27 ENCOUNTER — HOSPITAL ENCOUNTER (INPATIENT)
Age: 72
LOS: 3 days | Discharge: HOME | DRG: 165 | End: 2017-09-30
Attending: SURGERY | Admitting: SURGERY
Payer: MEDICARE

## 2017-09-27 ENCOUNTER — APPOINTMENT (OUTPATIENT)
Dept: GENERAL RADIOLOGY | Age: 72
DRG: 165 | End: 2017-09-27
Attending: SURGERY
Payer: MEDICARE

## 2017-09-27 ENCOUNTER — ANESTHESIA (OUTPATIENT)
Dept: SURGERY | Age: 72
DRG: 165 | End: 2017-09-27
Payer: MEDICARE

## 2017-09-27 PROBLEM — C34.10 LUNG CANCER, UPPER LOBE (HCC): Status: ACTIVE | Noted: 2017-09-27

## 2017-09-27 LAB
ABO + RH BLD: NORMAL
BLOOD GROUP ANTIBODIES SERPL: NORMAL
GLUCOSE BLD STRIP.AUTO-MCNC: 106 MG/DL (ref 65–100)
SPECIMEN EXP DATE BLD: NORMAL

## 2017-09-27 PROCEDURE — 77030005424 HC CATH THOR GTNG -A: Performed by: SURGERY

## 2017-09-27 PROCEDURE — 77030035048 HC TRCR ENDOSC OPTCL COVD -B: Performed by: SURGERY

## 2017-09-27 PROCEDURE — 88305 TISSUE EXAM BY PATHOLOGIST: CPT | Performed by: SURGERY

## 2017-09-27 PROCEDURE — 71010 XR CHEST PORT: CPT

## 2017-09-27 PROCEDURE — 65270000029 HC RM PRIVATE

## 2017-09-27 PROCEDURE — 77030009850 HC PCH ENDOSC SPEC COOK -B: Performed by: SURGERY

## 2017-09-27 PROCEDURE — 77030008671 HC TU ENDO/BRNC CUF COVD -B: Performed by: ANESTHESIOLOGY

## 2017-09-27 PROCEDURE — 77030005401 HC CATH RAD ARRO -A: Performed by: ANESTHESIOLOGY

## 2017-09-27 PROCEDURE — 74011250636 HC RX REV CODE- 250/636: Performed by: SURGERY

## 2017-09-27 PROCEDURE — 77030034850: Performed by: SURGERY

## 2017-09-27 PROCEDURE — 74011000250 HC RX REV CODE- 250

## 2017-09-27 PROCEDURE — 74011250636 HC RX REV CODE- 250/636

## 2017-09-27 PROCEDURE — 07B74ZX EXCISION OF THORAX LYMPHATIC, PERCUTANEOUS ENDOSCOPIC APPROACH, DIAGNOSTIC: ICD-10-PCS | Performed by: SURGERY

## 2017-09-27 PROCEDURE — 77030022473 HC HNDL ENDO GIA UNIV USDA -C: Performed by: SURGERY

## 2017-09-27 PROCEDURE — 77030008756 HC TU IRR SUC STRY -B: Performed by: SURGERY

## 2017-09-27 PROCEDURE — 77030032490 HC SLV COMPR SCD KNE COVD -B: Performed by: SURGERY

## 2017-09-27 PROCEDURE — 74011000258 HC RX REV CODE- 258: Performed by: SURGERY

## 2017-09-27 PROCEDURE — 77030016570 HC BLNKT BAIR HGGR 3M -B: Performed by: ANESTHESIOLOGY

## 2017-09-27 PROCEDURE — 77030020407 HC IV BLD WRMR ST 3M -A: Performed by: ANESTHESIOLOGY

## 2017-09-27 PROCEDURE — 77030021508 HC STPLR ENDO GIA COVD -C: Performed by: SURGERY

## 2017-09-27 PROCEDURE — 77030018836 HC SOL IRR NACL ICUM -A: Performed by: SURGERY

## 2017-09-27 PROCEDURE — 77030035051: Performed by: SURGERY

## 2017-09-27 PROCEDURE — 74011250636 HC RX REV CODE- 250/636: Performed by: ANESTHESIOLOGY

## 2017-09-27 PROCEDURE — 77030013794 HC KT TRNSDUC BLD EDWD -B: Performed by: ANESTHESIOLOGY

## 2017-09-27 PROCEDURE — 77030019908 HC STETH ESOPH SIMS -A: Performed by: ANESTHESIOLOGY

## 2017-09-27 PROCEDURE — 77030020782 HC GWN BAIR PAWS FLX 3M -B: Performed by: ANESTHESIOLOGY

## 2017-09-27 PROCEDURE — 77030009965 HC RELD STPLR ENDOS COVD -D: Performed by: SURGERY

## 2017-09-27 PROCEDURE — 77030035045 HC TRCR ENDOSC VRSPRT BLDLSS COVD -B: Performed by: SURGERY

## 2017-09-27 PROCEDURE — 77030008467 HC STPLR SKN COVD -B: Performed by: SURGERY

## 2017-09-27 PROCEDURE — 77030037378 HC BRONCHOSCOPE DISP TRAN -D: Performed by: ANESTHESIOLOGY

## 2017-09-27 PROCEDURE — 77030000038 HC TIP SCIS LAPSCP SURI -B: Performed by: SURGERY

## 2017-09-27 PROCEDURE — 77030027498 HC RELD STLPR EGIA VASC COVD -C: Performed by: SURGERY

## 2017-09-27 PROCEDURE — 76010000174 HC OR TIME 3.5 TO 4 HR INTENSV-TIER 1: Performed by: SURGERY

## 2017-09-27 PROCEDURE — 77030008522 HC TBNG INSUF LAPRO STRY -B: Performed by: SURGERY

## 2017-09-27 PROCEDURE — 0BTC4ZZ RESECTION OF RIGHT UPPER LUNG LOBE, PERCUTANEOUS ENDOSCOPIC APPROACH: ICD-10-PCS | Performed by: SURGERY

## 2017-09-27 PROCEDURE — 77030002996 HC SUT SLK J&J -A: Performed by: SURGERY

## 2017-09-27 PROCEDURE — 77030014007 HC SPNG HEMSTAT J&J -B: Performed by: SURGERY

## 2017-09-27 PROCEDURE — 77030012390 HC DRN CHST BTL GTNG -B: Performed by: SURGERY

## 2017-09-27 PROCEDURE — 76060000039 HC ANESTHESIA 4 TO 4.5 HR: Performed by: SURGERY

## 2017-09-27 PROCEDURE — 77030031139 HC SUT VCRL2 J&J -A: Performed by: SURGERY

## 2017-09-27 PROCEDURE — 77030022474 HC RELD STPLR ENDO GIA COVD -C: Performed by: SURGERY

## 2017-09-27 PROCEDURE — 88307 TISSUE EXAM BY PATHOLOGIST: CPT | Performed by: SURGERY

## 2017-09-27 PROCEDURE — 74011000250 HC RX REV CODE- 250: Performed by: SURGERY

## 2017-09-27 PROCEDURE — 76210000000 HC OR PH I REC 2 TO 2.5 HR: Performed by: SURGERY

## 2017-09-27 PROCEDURE — 77030013292 HC BOWL MX PRSM J&J -A: Performed by: ANESTHESIOLOGY

## 2017-09-27 PROCEDURE — 86900 BLOOD TYPING SEROLOGIC ABO: CPT | Performed by: ANESTHESIOLOGY

## 2017-09-27 PROCEDURE — 82962 GLUCOSE BLOOD TEST: CPT

## 2017-09-27 PROCEDURE — 77030011640 HC PAD GRND REM COVD -A: Performed by: SURGERY

## 2017-09-27 PROCEDURE — 74011250637 HC RX REV CODE- 250/637: Performed by: SURGERY

## 2017-09-27 PROCEDURE — 74011250637 HC RX REV CODE- 250/637: Performed by: ANESTHESIOLOGY

## 2017-09-27 RX ORDER — LIDOCAINE HYDROCHLORIDE 20 MG/ML
INJECTION, SOLUTION EPIDURAL; INFILTRATION; INTRACAUDAL; PERINEURAL AS NEEDED
Status: DISCONTINUED | OUTPATIENT
Start: 2017-09-27 | End: 2017-09-27 | Stop reason: HOSPADM

## 2017-09-27 RX ORDER — PROPOFOL 10 MG/ML
INJECTION, EMULSION INTRAVENOUS AS NEEDED
Status: DISCONTINUED | OUTPATIENT
Start: 2017-09-27 | End: 2017-09-27 | Stop reason: HOSPADM

## 2017-09-27 RX ORDER — SODIUM CHLORIDE 0.9 % (FLUSH) 0.9 %
5-10 SYRINGE (ML) INJECTION AS NEEDED
Status: DISCONTINUED | OUTPATIENT
Start: 2017-09-27 | End: 2017-09-27 | Stop reason: HOSPADM

## 2017-09-27 RX ORDER — HYDROMORPHONE HYDROCHLORIDE 2 MG/ML
0.5 INJECTION, SOLUTION INTRAMUSCULAR; INTRAVENOUS; SUBCUTANEOUS
Status: COMPLETED | OUTPATIENT
Start: 2017-09-27 | End: 2017-09-27

## 2017-09-27 RX ORDER — SODIUM CHLORIDE, SODIUM LACTATE, POTASSIUM CHLORIDE, CALCIUM CHLORIDE 600; 310; 30; 20 MG/100ML; MG/100ML; MG/100ML; MG/100ML
1000 INJECTION, SOLUTION INTRAVENOUS ONCE
Status: COMPLETED | OUTPATIENT
Start: 2017-09-27 | End: 2017-09-27

## 2017-09-27 RX ORDER — HYDROMORPHONE HYDROCHLORIDE 1 MG/ML
0.5 INJECTION, SOLUTION INTRAMUSCULAR; INTRAVENOUS; SUBCUTANEOUS
Status: DISCONTINUED | OUTPATIENT
Start: 2017-09-27 | End: 2017-09-30 | Stop reason: HOSPADM

## 2017-09-27 RX ORDER — SODIUM CHLORIDE, SODIUM LACTATE, POTASSIUM CHLORIDE, CALCIUM CHLORIDE 600; 310; 30; 20 MG/100ML; MG/100ML; MG/100ML; MG/100ML
125 INJECTION, SOLUTION INTRAVENOUS CONTINUOUS
Status: DISCONTINUED | OUTPATIENT
Start: 2017-09-27 | End: 2017-09-27

## 2017-09-27 RX ORDER — OXYCODONE AND ACETAMINOPHEN 10; 325 MG/1; MG/1
1 TABLET ORAL
Status: DISCONTINUED | OUTPATIENT
Start: 2017-09-27 | End: 2017-09-30 | Stop reason: HOSPADM

## 2017-09-27 RX ORDER — SODIUM CHLORIDE 0.9 % (FLUSH) 0.9 %
5-10 SYRINGE (ML) INJECTION EVERY 8 HOURS
Status: DISCONTINUED | OUTPATIENT
Start: 2017-09-27 | End: 2017-09-27 | Stop reason: HOSPADM

## 2017-09-27 RX ORDER — SODIUM CHLORIDE, SODIUM LACTATE, POTASSIUM CHLORIDE, CALCIUM CHLORIDE 600; 310; 30; 20 MG/100ML; MG/100ML; MG/100ML; MG/100ML
500 INJECTION, SOLUTION INTRAVENOUS ONCE
Status: DISCONTINUED | OUTPATIENT
Start: 2017-09-27 | End: 2017-09-27 | Stop reason: HOSPADM

## 2017-09-27 RX ORDER — DULOXETIN HYDROCHLORIDE 60 MG/1
60 CAPSULE, DELAYED RELEASE ORAL
Status: DISCONTINUED | OUTPATIENT
Start: 2017-09-27 | End: 2017-09-30 | Stop reason: HOSPADM

## 2017-09-27 RX ORDER — VERAPAMIL HYDROCHLORIDE 180 MG/1
180 TABLET, EXTENDED RELEASE ORAL DAILY
Status: DISCONTINUED | OUTPATIENT
Start: 2017-09-28 | End: 2017-09-30 | Stop reason: HOSPADM

## 2017-09-27 RX ORDER — SODIUM CHLORIDE 0.9 % (FLUSH) 0.9 %
5-10 SYRINGE (ML) INJECTION AS NEEDED
Status: DISCONTINUED | OUTPATIENT
Start: 2017-09-27 | End: 2017-09-27

## 2017-09-27 RX ORDER — MIDAZOLAM HYDROCHLORIDE 1 MG/ML
2 INJECTION, SOLUTION INTRAMUSCULAR; INTRAVENOUS
Status: DISCONTINUED | OUTPATIENT
Start: 2017-09-27 | End: 2017-09-27 | Stop reason: HOSPADM

## 2017-09-27 RX ORDER — GLYCOPYRROLATE 0.2 MG/ML
INJECTION INTRAMUSCULAR; INTRAVENOUS AS NEEDED
Status: DISCONTINUED | OUTPATIENT
Start: 2017-09-27 | End: 2017-09-27 | Stop reason: HOSPADM

## 2017-09-27 RX ORDER — NALOXONE HYDROCHLORIDE 0.4 MG/ML
0.1 INJECTION, SOLUTION INTRAMUSCULAR; INTRAVENOUS; SUBCUTANEOUS AS NEEDED
Status: DISCONTINUED | OUTPATIENT
Start: 2017-09-27 | End: 2017-09-27

## 2017-09-27 RX ORDER — ENOXAPARIN SODIUM 100 MG/ML
40 INJECTION SUBCUTANEOUS EVERY 24 HOURS
Status: DISCONTINUED | OUTPATIENT
Start: 2017-09-28 | End: 2017-09-30 | Stop reason: HOSPADM

## 2017-09-27 RX ORDER — HYDROCHLOROTHIAZIDE 25 MG/1
25 TABLET ORAL
Status: DISCONTINUED | OUTPATIENT
Start: 2017-09-28 | End: 2017-09-30 | Stop reason: HOSPADM

## 2017-09-27 RX ORDER — SODIUM CHLORIDE, SODIUM LACTATE, POTASSIUM CHLORIDE, CALCIUM CHLORIDE 600; 310; 30; 20 MG/100ML; MG/100ML; MG/100ML; MG/100ML
INJECTION, SOLUTION INTRAVENOUS
Status: DISCONTINUED | OUTPATIENT
Start: 2017-09-27 | End: 2017-09-27 | Stop reason: HOSPADM

## 2017-09-27 RX ORDER — SOLIFENACIN SUCCINATE 10 MG/1
10 TABLET, FILM COATED ORAL DAILY
Status: DISCONTINUED | OUTPATIENT
Start: 2017-09-28 | End: 2017-09-30 | Stop reason: HOSPADM

## 2017-09-27 RX ORDER — ONDANSETRON 2 MG/ML
INJECTION INTRAMUSCULAR; INTRAVENOUS AS NEEDED
Status: DISCONTINUED | OUTPATIENT
Start: 2017-09-27 | End: 2017-09-27 | Stop reason: HOSPADM

## 2017-09-27 RX ORDER — SODIUM CHLORIDE 0.9 % (FLUSH) 0.9 %
5-10 SYRINGE (ML) INJECTION EVERY 8 HOURS
Status: DISCONTINUED | OUTPATIENT
Start: 2017-09-27 | End: 2017-09-30 | Stop reason: HOSPADM

## 2017-09-27 RX ORDER — KETOROLAC TROMETHAMINE 30 MG/ML
15 INJECTION, SOLUTION INTRAMUSCULAR; INTRAVENOUS AS NEEDED
Status: COMPLETED | OUTPATIENT
Start: 2017-09-27 | End: 2017-09-27

## 2017-09-27 RX ORDER — SODIUM CHLORIDE 0.9 % (FLUSH) 0.9 %
5-10 SYRINGE (ML) INJECTION AS NEEDED
Status: DISCONTINUED | OUTPATIENT
Start: 2017-09-27 | End: 2017-09-30 | Stop reason: HOSPADM

## 2017-09-27 RX ORDER — LIDOCAINE HYDROCHLORIDE 10 MG/ML
0.1 INJECTION INFILTRATION; PERINEURAL AS NEEDED
Status: DISCONTINUED | OUTPATIENT
Start: 2017-09-27 | End: 2017-09-27 | Stop reason: HOSPADM

## 2017-09-27 RX ORDER — NEOSTIGMINE METHYLSULFATE 1 MG/ML
INJECTION, SOLUTION INTRAVENOUS AS NEEDED
Status: DISCONTINUED | OUTPATIENT
Start: 2017-09-27 | End: 2017-09-27 | Stop reason: HOSPADM

## 2017-09-27 RX ORDER — LEVOFLOXACIN 5 MG/ML
500 INJECTION, SOLUTION INTRAVENOUS ONCE
Status: COMPLETED | OUTPATIENT
Start: 2017-09-27 | End: 2017-09-27

## 2017-09-27 RX ORDER — ONDANSETRON 2 MG/ML
4 INJECTION INTRAMUSCULAR; INTRAVENOUS
Status: DISCONTINUED | OUTPATIENT
Start: 2017-09-27 | End: 2017-09-30 | Stop reason: HOSPADM

## 2017-09-27 RX ORDER — DEXAMETHASONE SODIUM PHOSPHATE 4 MG/ML
INJECTION, SOLUTION INTRA-ARTICULAR; INTRALESIONAL; INTRAMUSCULAR; INTRAVENOUS; SOFT TISSUE AS NEEDED
Status: DISCONTINUED | OUTPATIENT
Start: 2017-09-27 | End: 2017-09-27 | Stop reason: HOSPADM

## 2017-09-27 RX ORDER — ACETAMINOPHEN 10 MG/ML
1000 INJECTION, SOLUTION INTRAVENOUS ONCE
Status: COMPLETED | OUTPATIENT
Start: 2017-09-27 | End: 2017-09-27

## 2017-09-27 RX ORDER — SODIUM CHLORIDE, SODIUM LACTATE, POTASSIUM CHLORIDE, CALCIUM CHLORIDE 600; 310; 30; 20 MG/100ML; MG/100ML; MG/100ML; MG/100ML
125 INJECTION, SOLUTION INTRAVENOUS CONTINUOUS
Status: DISCONTINUED | OUTPATIENT
Start: 2017-09-27 | End: 2017-09-27 | Stop reason: HOSPADM

## 2017-09-27 RX ORDER — ROCURONIUM BROMIDE 10 MG/ML
INJECTION, SOLUTION INTRAVENOUS AS NEEDED
Status: DISCONTINUED | OUTPATIENT
Start: 2017-09-27 | End: 2017-09-27 | Stop reason: HOSPADM

## 2017-09-27 RX ORDER — BUPIVACAINE HYDROCHLORIDE AND EPINEPHRINE 5; 5 MG/ML; UG/ML
INJECTION, SOLUTION EPIDURAL; INTRACAUDAL; PERINEURAL AS NEEDED
Status: DISCONTINUED | OUTPATIENT
Start: 2017-09-27 | End: 2017-09-27 | Stop reason: HOSPADM

## 2017-09-27 RX ORDER — FENTANYL CITRATE 50 UG/ML
INJECTION, SOLUTION INTRAMUSCULAR; INTRAVENOUS AS NEEDED
Status: DISCONTINUED | OUTPATIENT
Start: 2017-09-27 | End: 2017-09-27 | Stop reason: HOSPADM

## 2017-09-27 RX ORDER — OXYCODONE HYDROCHLORIDE 5 MG/1
10 TABLET ORAL
Status: DISCONTINUED | OUTPATIENT
Start: 2017-09-27 | End: 2017-09-27

## 2017-09-27 RX ORDER — DEXTROSE MONOHYDRATE AND SODIUM CHLORIDE 5; .45 G/100ML; G/100ML
75 INJECTION, SOLUTION INTRAVENOUS CONTINUOUS
Status: DISCONTINUED | OUTPATIENT
Start: 2017-09-27 | End: 2017-09-30

## 2017-09-27 RX ADMIN — HYDROMORPHONE HYDROCHLORIDE: 2 INJECTION INTRAMUSCULAR; INTRAVENOUS; SUBCUTANEOUS at 17:31

## 2017-09-27 RX ADMIN — SODIUM CHLORIDE, SODIUM LACTATE, POTASSIUM CHLORIDE, CALCIUM CHLORIDE: 600; 310; 30; 20 INJECTION, SOLUTION INTRAVENOUS at 07:44

## 2017-09-27 RX ADMIN — HYDROMORPHONE HYDROCHLORIDE 0.5 MG: 2 INJECTION, SOLUTION INTRAMUSCULAR; INTRAVENOUS; SUBCUTANEOUS at 11:46

## 2017-09-27 RX ADMIN — NEOSTIGMINE METHYLSULFATE 3 MG: 1 INJECTION, SOLUTION INTRAVENOUS at 10:53

## 2017-09-27 RX ADMIN — ROCURONIUM BROMIDE 10 MG: 10 INJECTION, SOLUTION INTRAVENOUS at 09:19

## 2017-09-27 RX ADMIN — DEXTROSE MONOHYDRATE AND SODIUM CHLORIDE 75 ML/HR: 5; .45 INJECTION, SOLUTION INTRAVENOUS at 22:36

## 2017-09-27 RX ADMIN — HYDROMORPHONE HYDROCHLORIDE 0.5 MG: 2 INJECTION, SOLUTION INTRAMUSCULAR; INTRAVENOUS; SUBCUTANEOUS at 14:37

## 2017-09-27 RX ADMIN — HYDROMORPHONE HYDROCHLORIDE: 2 INJECTION INTRAMUSCULAR; INTRAVENOUS; SUBCUTANEOUS at 22:33

## 2017-09-27 RX ADMIN — GLYCOPYRROLATE 0.4 MG: 0.2 INJECTION INTRAMUSCULAR; INTRAVENOUS at 10:53

## 2017-09-27 RX ADMIN — LIDOCAINE HYDROCHLORIDE 80 MG: 20 INJECTION, SOLUTION EPIDURAL; INFILTRATION; INTRACAUDAL; PERINEURAL at 07:24

## 2017-09-27 RX ADMIN — MIDAZOLAM HYDROCHLORIDE 2 MG: 1 INJECTION, SOLUTION INTRAMUSCULAR; INTRAVENOUS at 07:10

## 2017-09-27 RX ADMIN — KETOROLAC TROMETHAMINE 15 MG: 30 INJECTION, SOLUTION INTRAMUSCULAR at 11:54

## 2017-09-27 RX ADMIN — FENTANYL CITRATE 25 MCG: 50 INJECTION, SOLUTION INTRAMUSCULAR; INTRAVENOUS at 10:58

## 2017-09-27 RX ADMIN — ROCURONIUM BROMIDE 10 MG: 10 INJECTION, SOLUTION INTRAVENOUS at 08:53

## 2017-09-27 RX ADMIN — SODIUM CHLORIDE, SODIUM LACTATE, POTASSIUM CHLORIDE, AND CALCIUM CHLORIDE 125 ML/HR: 600; 310; 30; 20 INJECTION, SOLUTION INTRAVENOUS at 06:12

## 2017-09-27 RX ADMIN — ROCURONIUM BROMIDE 10 MG: 10 INJECTION, SOLUTION INTRAVENOUS at 09:57

## 2017-09-27 RX ADMIN — ACETAMINOPHEN 1000 MG: 10 INJECTION, SOLUTION INTRAVENOUS at 11:27

## 2017-09-27 RX ADMIN — LEVOFLOXACIN 500 MG: 5 INJECTION, SOLUTION INTRAVENOUS at 07:35

## 2017-09-27 RX ADMIN — ROCURONIUM BROMIDE 10 MG: 10 INJECTION, SOLUTION INTRAVENOUS at 10:17

## 2017-09-27 RX ADMIN — ONDANSETRON 4 MG: 2 INJECTION INTRAMUSCULAR; INTRAVENOUS at 10:41

## 2017-09-27 RX ADMIN — FENTANYL CITRATE 100 MCG: 50 INJECTION, SOLUTION INTRAMUSCULAR; INTRAVENOUS at 07:20

## 2017-09-27 RX ADMIN — SODIUM CHLORIDE, SODIUM LACTATE, POTASSIUM CHLORIDE, AND CALCIUM CHLORIDE: 600; 310; 30; 20 INJECTION, SOLUTION INTRAVENOUS at 10:56

## 2017-09-27 RX ADMIN — DULOXETINE HYDROCHLORIDE 60 MG: 60 CAPSULE, DELAYED RELEASE ORAL at 22:34

## 2017-09-27 RX ADMIN — SODIUM CHLORIDE, SODIUM LACTATE, POTASSIUM CHLORIDE, AND CALCIUM CHLORIDE 1000 ML: 600; 310; 30; 20 INJECTION, SOLUTION INTRAVENOUS at 11:45

## 2017-09-27 RX ADMIN — PROPOFOL 160 MG: 10 INJECTION, EMULSION INTRAVENOUS at 07:24

## 2017-09-27 RX ADMIN — OXYCODONE HYDROCHLORIDE 10 MG: 5 TABLET ORAL at 14:40

## 2017-09-27 RX ADMIN — SODIUM CHLORIDE, SODIUM LACTATE, POTASSIUM CHLORIDE, AND CALCIUM CHLORIDE 125 ML/HR: 600; 310; 30; 20 INJECTION, SOLUTION INTRAVENOUS at 12:30

## 2017-09-27 RX ADMIN — SODIUM CHLORIDE, SODIUM LACTATE, POTASSIUM CHLORIDE, AND CALCIUM CHLORIDE 125 ML/HR: 600; 310; 30; 20 INJECTION, SOLUTION INTRAVENOUS at 13:46

## 2017-09-27 RX ADMIN — HYDROMORPHONE HYDROCHLORIDE 0.5 MG: 2 INJECTION, SOLUTION INTRAMUSCULAR; INTRAVENOUS; SUBCUTANEOUS at 11:23

## 2017-09-27 RX ADMIN — HYDROMORPHONE HYDROCHLORIDE 0.5 MG: 2 INJECTION, SOLUTION INTRAMUSCULAR; INTRAVENOUS; SUBCUTANEOUS at 13:12

## 2017-09-27 RX ADMIN — ROCURONIUM BROMIDE 50 MG: 10 INJECTION, SOLUTION INTRAVENOUS at 07:25

## 2017-09-27 RX ADMIN — Medication 10 ML: at 22:48

## 2017-09-27 RX ADMIN — DEXAMETHASONE SODIUM PHOSPHATE 4 MG: 4 INJECTION, SOLUTION INTRA-ARTICULAR; INTRALESIONAL; INTRAMUSCULAR; INTRAVENOUS; SOFT TISSUE at 09:10

## 2017-09-27 RX ADMIN — ROCURONIUM BROMIDE 10 MG: 10 INJECTION, SOLUTION INTRAVENOUS at 08:25

## 2017-09-27 NOTE — H&P
Surgery History and Physical    Subjective:      Lorraine Del Cid is a 74 y.o. female who presents for a right upper lobectomy. She has a biopsy proven adenocarcinoma. Pulmonary w/u shows she will tolerate a lobectomy.     Past Medical History:   Diagnosis Date    Basal cell carcinoma 2001    COPD     Diabetes ([facility] [address]     patient is diet controlled and does  not check glucose at home    Esophageal reflux     Fibromyalgia     Gastroesophageal reflux disease without esophagitis 8/10/2016    Generalized osteoarthrosis, involving multiple sites     GERD (gastroesophageal reflux disease)     somewhat controlled by medications    Hypertension     Lung cancer ([facility] [address] [facility] 06/2017    carcinoma right upper lung    Obstructive chronic bronchitis without exacerbation ([facility] [address] [facility]     Other abnormal glucose     HgbA1C 5.9 in 5/13    Other and unspecified special symptom or syndrome, not elsewhere classified 2001    intestinal blockage    Pain in joint, multiple sites     Prediabetes     HgbA1C 5.9 in 5/13    Pure hypercholesterolemia     Unspecified hypothyroidism 5/13    Urinary frequency      Past Surgical History:   Procedure Laterality Date    BREAST SURGERY PROCEDURE UNLISTED  8/04    left breast cyst aspiration/stereotactic core biopsy, fibroadenoma (benign)    ENDOSCOPY, COLON, DIAGNOSTIC  8/08    HX ACL RECONSTRUCTION      ACL repair right knee    HX BREAST BIOPSY Left     HX BUNIONECTOMY      bilateral    HX CATARACT REMOVAL      bilateral with IOL implants    HX CYST REMOVAL      ganglion    HX ORTHOPAEDIC      bunion surgery-bilateral    HX ORTHOPAEDIC  11/4/2016    right hip    HX OTHER SURGICAL  2002    face lift    HX OTHER SURGICAL      varicose veins surgery x2    TOTAL KNEE ARTHROPLASTY  10/05    right      Family History   Problem Relation Age of Onset    Cancer Mother     Coronary Artery Disease Father      MI at 72    Cancer Father      lung    Heart Disease Father     Hypertension Sister     Arthritis-osteo Other     Breast Cancer Neg Hx      Social History   Substance Use Topics    Smoking status: Former Smoker     Packs/day: 0.50     Years: 5.00     Quit date: 1/1/1990    Smokeless tobacco: Never Used    Alcohol use 1.2 oz/week     2 Glasses of wine per week      Comment: light, drinks wine      Prior to Admission medications    Medication Sig Start Date End Date Taking? Authorizing Provider   levothyroxine (SYNTHROID) 50 mcg tablet TAKE 1 TABLET EVERY DAY  BEFORE  BREAKFAST 8/7/17  Yes Shayne Mann NP   hydroCHLOROthiazide (HYDRODIURIL) 25 mg tablet Take 1 Tab by mouth daily. Patient taking differently: Take 25 mg by mouth every morning. 8/7/17  Yes Shayne Mann NP   losartan (COZAAR) 100 mg tablet [address] DAY  Patient taking differently: 100 mg every morning. TAKE ONE TABLET BY MOUTH EVERY DAY 8/7/17  Yes Shayne Mann NP   verapamil ER (VERELAN) 180 mg CR capsule Take 1 Cap by mouth daily. For Hypertension in addition to Metoprolol XL and Losartan 8/7/17  Yes Shayne Mann NP   omeprazole (PRILOSEC) 40 mg capsule Take 1 Cap by mouth daily. Indications: HEARTBURN  Patient taking differently: Take 40 mg by mouth every morning. Indications: HEARTBURN 8/7/17  Yes Shayne Mann NP   raNITIdine (ZANTAC) 300 mg tablet Take 1 Tab by mouth nightly. Indications: HEARTBURN 8/7/17  Yes Shayne Mann NP   DULoxetine (CYMBALTA) 60 mg capsule Take 1 Cap by mouth daily. Patient taking differently: Take 60 mg by mouth nightly. 8/7/17  Yes Shayneemma Mann NP   celecoxib (CELEBREX) 200 mg capsule Take 1 Cap by mouth two (2) times a day. 8/7/17  Yes Shayneemma Mann NP   tolterodine ER (DETROL LA) 4 mg ER capsule Take 1 Cap by mouth daily.  Indications: INCREASED URINARY FREQUENCY 8/7/17  Yes Shayne Mann NP   OTHER Go Hermila Go powder taken once a day   Yes Historical Provider   ASCORBIC ACID/COLLAGEN HYDR (COLLAGEN PLUS VITAMIN C PO) Take 6 Tabs by mouth daily. Yes Historical Provider   multivitamins chew Take 1 Tab by mouth daily. Yes Historical Provider   GLUCOSAMINE HCL/CHONDR BIRD A NA (OSTEO BI-FLEX PO) Take  by mouth. Yes Historical Provider   LACTOBACILLUS ACIDOPHILUS (PROBIOTIC PO) Take  by mouth. Yes Historical Provider   BIOTIN PO Take  by mouth. 5,000mcg   Yes Historical Provider   CALCIUM CARBONATE/VITAMIN D3 (CALTRATE 600 + D PO) Take  by mouth. With Magnesium and Zinc also   Yes Historical Provider      Allergies   Allergen Reactions    Lisinopril Cough       Review of Systems   All other systems reviewed and are negative. Objective:     No data found. No data recorded. Physical Exam   Constitutional: She is oriented to person, place, and time. She appears well-developed and well-nourished. No distress. HENT:   Head: Normocephalic and atraumatic. Eyes: Conjunctivae and EOM are normal. Pupils are equal, round, and reactive to light. Neck: Normal range of motion. Neck supple. Cardiovascular: Normal rate, regular rhythm and normal heart sounds. Pulmonary/Chest: Effort normal and breath sounds normal. No respiratory distress. She has no wheezes. Abdominal: Soft. Bowel sounds are normal. She exhibits no distension and no mass. There is no tenderness. There is no rebound and no guarding. Musculoskeletal: Normal range of motion. Neurological: She is alert and oriented to person, place, and time. Skin: Skin is warm and dry. She is not diaphoretic. Psychiatric: She has a normal mood and affect. Her behavior is normal. Thought content normal.       Assessment:     RUL adenocarcinoma    Plan:     Discussed the risk of surgery including but not limited to bleeding,death,air leak,infection,possible thoracotomy,  and the risks of general anesthetic. The patient understands the risks; any and all questions were answered to the patient's satisfaction.     Signed By: Aubrie Ta MD     September 27, 2017

## 2017-09-27 NOTE — ANESTHESIA PROCEDURE NOTES
Arterial Line Placement    Start time: 9/27/2017 7:39 AM  End time: 9/27/2017 7:41 AM  Performed by: Luis Daniel Braxton  Authorized by: Shannon WATSON     Pre-Procedure  Indications:  Arterial pressure monitoring and blood sampling  Preanesthetic Checklist: patient identified, risks and benefits discussed, anesthesia consent, site marked, patient being monitored, timeout performed and patient being monitored    Timeout Time: 07:39        Procedure:   Prep:  Chlorhexidine  Seldinger Technique?: Yes    Orientation:  Left  Location:  Radial artery  Catheter size:  20 G  Number of attempts:  1  Cont Cardiac Output Sensor: No      Assessment:   Post-procedure:  Line secured and sterile dressing applied  Patient Tolerance:  Patient tolerated the procedure well with no immediate complications

## 2017-09-27 NOTE — PROGRESS NOTES
TRANSFER - IN REPORT:    Verbal report received from Cristina Pina, [facility]  being received from ER for routine progression of care      Report consisted of patients Situation, Background, Assessment and   Recommendations(SBAR). Information from the following report(s) SBAR, Kardex, Procedure Summary, MAR and Recent Results was reviewed with the receiving nurse. Opportunity for questions and clarification was provided. Assessment completed upon patients arrival to unit and care assumed.

## 2017-09-27 NOTE — PERIOP NOTES
Dr. Vitaly Hopkins updated re: patient's decreased blood pressure and urine output. Orders received.

## 2017-09-27 NOTE — OP NOTES
[address]   OPERATIVE REPORT       Name:  Ras Ruelas   MR#:  [id number]   :  1945   Account #:  [de-identified]   Date of Adm:  2017       DATE OF PROCEDURE: 2017    PREOPERATIVE DIAGNOSIS: Adenocarcinoma of the right upper lobe. POSTOPERATIVE DIAGNOSIS: Adenocarcinoma of the right upper lobe. PROCEDURE: Video-assisted thoracoscopy, right upper lobectomy   with lymphadenectomy. SURGEON: Sue Duran. MD Gamal Verdin M.D. ANESTHESIA: General.    COMPLICATIONS: None. COUNTS: Correct. ESTIMATED BLOOD LOSS: 75 mL. SPECIMEN   1. Right upper lobe. 2. Lymph nodes. PROCEDURE: After the patient was asleep and rolled onto her left   side, the right chest was prepped and draped in a sterile   fashion. A time-out was carried out and all were in agreement. A   5 mm incision was made in the anterior axillary line at the 8th   and 9th interspace, and Optiview utilized to gain entry into the   chest cavity. Visualization was carried out. Then the thorax was   clear of any evidence of metastatic disease. Posterior axillary   line 5 mm trocar placed near the same interspace. This allowed   me to insert a grasper, palpate, and actually find the mass   within the right upper lobe. This was located posteriorly at the   fissure. An additional 5 mm trocar was placed posterior to the   tip of the scapula, and a mini thoracotomy incision was made   anterior to the scapula between the 4th and 5th interspaces. The   posterior axillary 5 mm port was removed and a 12 mm trocar   placed for stapling. The inferior pulmonary ligament was divided   and level 9 lymph nodes removed and sent to Pathology. Dissection was carried out freeing anteriorly and posteriorly.    The branches of the pulmonary vein to the right upper lobe were   identified and these were taken first. Dissection was carried   posteriorly until they were completely dissected free and then Endo-BLAIRE with gray staples utilized to divide them. I then began   dissection in the fissure. The pulmonary artery was located. Then dissection was carried anteriorly over the top of the   pulmonary artery, allowing us to put an Endo BLAIRE with purple   staples to divide the anterior fissure. Once this was completed,   2 branches of the pulmonary artery to the upper lobe were   immediately visible. These were taken with Endo BLAIRE and gray   staples. The posterior fissure was then divided with Endo BLAIRE   and purple staples. There was an additional final large branch   of the pulmonary artery of the right upper lobe, which was taken   with Endo BLAIRE and gray staples. This then allowed us to staple   the bronchus. The stapler was closed. Insufflation was carried   out, and excellent insufflation of the middle and lower lobes   was seen. The stapler fired and the right upper lobe was   completely free. It is of note that during this dissection all   lymph nodes encountered were removed and appropriately labeled   and sent to Pathology. A Cook bag was then placed. The right   upper lobe was placed within it, and this was brought out to the   mini thoracotomy site. The lobe was sent to Pathology. Then,   irrigation was carried out. No leaks were seen. No bleeding was   seen. Additional dissection, removing level 4 and level 7 as   nodes were done. These were sent to Pathology. A small piece of   Surgicel was placed in dissection bed secondary to some mild oozing. At this   time, two 32 chest tubes were placed. These were through trocar   sites. Anteriorly, a straight in the apex and posteriorly a   right angle in the base. These were secured to the skin with 2-0   silks; 0 Vicryl was used to close the mini thoracotomy incision   and the muscular level at subcutaneous tissue. Staples were then   used to close the incisions. The patient tolerated the procedure   well. MD FRACISCO Little / ADRIANA NOWAK: 09/27/2017   11:36   T:  09/27/2017   12:03   Job #:  [id number]

## 2017-09-27 NOTE — IP AVS SNAPSHOT
[address] [facility] 
 
 
 [address] [facility] [address] [facility] 
[phone] Patient: Ananya Leong MRN: [id number] OFQ:1/88/1884 You are allergic to the following Allergen Reactions Lisinopril Cough Recent Documentation Height Weight BMI OB Status Smoking Status 1.702 m 83.5 kg 28.85 kg/m2 Postmenopausal Former Smoker Emergency Contacts Name Discharge Info Relation Home Work Mobile Colin Woodruff  Boyfriend [17] [phone] Radha Rainey  Child [2] [phone] Albina Kulkarni  Child [2] [phone] Vipul Rizzo  Daughter [21] [phone] About your hospitalization You were admitted on:  September 27, 2017 You last received care in the:  [facility] 6 MED SURG You were discharged on:  September 30, 2017 Unit phone number:  [phone] Why you were hospitalized Your primary diagnosis was:  Lung Cancer, Upper Lobe (Hcc) Providers Seen During Your Hospitalizations Provider Role Specialty Primary office phone Simón Sarkar MD Attending Provider General Surgery [phone] Your Primary Care Physician (PCP) Primary Care Physician Office Phone Office Fax Dc Michela,  [address] [phone] Follow-up Information Follow up With Details Comments Contact Info Nneka Olivo NP   [address] [place] [address] [address] [facility] 
[phone] Simón Sarkar MD On 9/30/2017 Call office on Monday to schedule a follow up appointment with Lucian Hendricks in 7-10 [phone] [facility]  
[address] [facility] [address] 
[phone] Your Appointments Tuesday October 24, 2017  9:45 AM EDT  
LAB with [address]  
[address] [facility] [place] [address] [facility]  
[phone] Tuesday October 24, 2017 10:15 AM EDT Follow Up with Emmanuel Kendrick MD  
 [facility]) [place]/ Garrett Pickard [address] [facility] [address] [phone] Current Discharge Medication List  
  
START taking these medications Dose & Instructions Dispensing Information Comments Morning Noon Evening Bedtime  
 oxyCODONE-acetaminophen 7.5-325 mg per tablet Commonly known as:  PERCOCET Your last dose was: Your next dose is:    
   
   
 Dose:  1-2 Tab Take 1-2 Tabs by mouth every four (4) hours as needed for Pain. Max Daily Amount: 12 Tabs. Quantity:  40 Tab Refills:  0 CONTINUE these medications which have CHANGED Dose & Instructions Dispensing Information Comments Morning Noon Evening Bedtime DULoxetine 60 mg capsule Commonly known as:  CYMBALTA What changed:  when to take this Your last dose was: Your next dose is:    
   
   
 Dose:  60 mg Take 1 Cap by mouth daily. Quantity:  90 Cap Refills:  1  
     
   
   
   
  
 hydroCHLOROthiazide 25 mg tablet Commonly known as:  HYDRODIURIL What changed:  when to take this Your last dose was: Your next dose is:    
   
   
 Dose:  25 mg Take 1 Tab by mouth daily. Quantity:  90 Tab Refills:  1  
     
   
   
   
  
 losartan 100 mg tablet Commonly known as:  COZAAR What changed:   
- how much to take - when to take this 
- additional instructions Your last dose was: Your next dose is: TAKE ONE TABLET BY MOUTH EVERY DAY Quantity:  90 Tab Refills:  1  
     
   
   
   
  
 omeprazole 40 mg capsule Commonly known as:  PRILOSEC What changed:  when to take this Your last dose was: Your next dose is:    
   
   
 Dose:  40 mg Take 1 Cap by mouth daily. Indications: HEARTBURN Quantity:  90 Cap Refills:  1 CONTINUE these medications which have NOT CHANGED Dose & Instructions Dispensing Information Comments Morning Noon Evening Bedtime BIOTIN PO Your last dose was: Your next dose is: Take  by mouth. 5,000mcg Refills:  0  
     
   
   
   
  
 CALTRATE 600 + D PO Your last dose was: Your next dose is: Take  by mouth. With Magnesium and Zinc also Refills:  0  
     
   
   
   
  
 celecoxib 200 mg capsule Commonly known as:  CELEBREX Your last dose was: Your next dose is:    
   
   
 Dose:  200 mg Take 1 Cap by mouth two (2) times a day. Quantity:  180 Cap Refills:  5 COLLAGEN PLUS VITAMIN C PO Your last dose was: Your next dose is:    
   
   
 Dose:  6 Tab Take 6 Tabs by mouth daily. Refills:  0  
     
   
   
   
  
 levothyroxine 50 mcg tablet Commonly known as:  SYNTHROID Your last dose was: Your next dose is: TAKE 1 TABLET EVERY DAY  BEFORE  BREAKFAST Quantity:  90 Tab Refills:  1  
     
   
   
   
  
 multivitamins Chew Your last dose was: Your next dose is:    
   
   
 Dose:  1 Tab Take 1 Tab by mouth daily. Refills:  0  
     
   
   
   
  
 OSTEO BI-FLEX PO Your last dose was: Your next dose is: Take  by mouth. Refills:  0  
     
   
   
   
  
 OTHER Your last dose was: Your next dose is:    
   
   
 Go Hermila Go powder taken once a day Refills:  0 PROBIOTIC PO Your last dose was: Your next dose is: Take  by mouth. Refills:  0  
     
   
   
   
  
 raNITIdine 300 mg tablet Commonly known as:  ZANTAC Your last dose was: Your next dose is:    
   
   
 Dose:  300 mg Take 1 Tab by mouth nightly. Indications: HEARTBURN Quantity:  90 Tab Refills:  1  
     
   
   
   
  
 tolterodine ER 4 mg ER capsule Commonly known as:  Kylie Davis  
   
 Your last dose was: Your next dose is:    
   
   
 Dose:  4 mg Take 1 Cap by mouth daily. Indications: INCREASED URINARY FREQUENCY Quantity:  90 Cap Refills:  1  
     
   
   
   
  
 verapamil  mg CR capsule Commonly known as:  Kyle Horvath Your last dose was: Your next dose is:    
   
   
 Dose:  180 mg Take 1 Cap by mouth daily. For Hypertension in addition to Metoprolol XL and Losartan Quantity:  90 Cap Refills:  1 Where to Get Your Medications Information on where to get these meds will be given to you by the nurse or doctor. ! Ask your nurse or doctor about these medications  
  oxyCODONE-acetaminophen 7.5-325 mg per tablet Discharge Instructions DISCHARGE SUMMARY from Nurse The following personal items are in your possession at time of discharge: 
 
Dental Appliances: Lowers, Partials, Uppers Visual Aid: None Jewelry: None Clothing: Shirt, Pants, Footwear Other Valuables: None PATIENT INSTRUCTIONS: 
 
After general anesthesia or intravenous sedation, for 24 hours or while taking prescription Narcotics: · Limit your activities · Do not drive and operate hazardous machinery · Do not make important personal or business decisions · Do  not drink alcoholic beverages · If you have not urinated within 8 hours after discharge, please contact your surgeon on call. Report the following to your surgeon: 
· Excessive pain, swelling, redness or odor of or around the surgical area · Temperature over 100.5 · Nausea and vomiting lasting longer than 4 hours or if unable to take medications · Any signs of decreased circulation or nerve impairment to extremity: change in color, persistent  numbness, tingling, coldness or increase pain · Any questions What to do at Home: 
Recommended activity: Activity as tolerated and no driving for today and No heavy lifting for 3 weeks, *** 
 
 If you experience any of the following symptoms Increased SOB, Bleeding from chest tube incision, Fevers, chills, weakness, please follow up with Dr. Tammy Flores. *  Please give a list of your current medications to your Primary Care Provider. *  Please update this list whenever your medications are discontinued, doses are 
    changed, or new medications (including over-the-counter products) are added. *  Please carry medication information at all times in case of emergency situations. These are general instructions for a healthy lifestyle: No smoking/ No tobacco products/ Avoid exposure to second hand smoke Surgeon General's Warning:  Quitting smoking now greatly reduces serious risk to your health. Obesity, smoking, and sedentary lifestyle greatly increases your risk for illness A healthy diet, regular physical exercise & weight monitoring are important for maintaining a healthy lifestyle You may be retaining fluid if you have a history of heart failure or if you experience any of the following symptoms:  Weight gain of 3 pounds or more overnight or 5 pounds in a week, increased swelling in our hands or feet or shortness of breath while lying flat in bed. Please call your doctor as soon as you notice any of these symptoms; do not wait until your next office visit. Recognize signs and symptoms of STROKE: 
 
F-face looks uneven A-arms unable to move or move unevenly S-speech slurred or non-existent T-time-call 911 as soon as signs and symptoms begin-DO NOT go Back to bed or wait to see if you get better-TIME IS BRAIN. Warning Signs of HEART ATTACK Call 911 if you have these symptoms: 
? Chest discomfort. Most heart attacks involve discomfort in the center of the chest that lasts more than a few minutes, or that goes away and comes back. It can feel like uncomfortable pressure, squeezing, fullness, or pain. ? Discomfort in other areas of the upper body. Symptoms can include pain or discomfort in one or both arms, the back, neck, jaw, or stomach. ? Shortness of breath with or without chest discomfort. ? Other signs may include breaking out in a cold sweat, nausea, or lightheadedness. Don't wait more than five minutes to call [address]! Fast action can save your life. Calling 911 is almost always the fastest way to get lifesaving treatment. Emergency Medical Services staff can begin treatment when they arrive  up to an hour sooner than if someone gets to the hospital by car. The discharge information has been reviewed with the patient. The patient verbalized understanding. Discharge medications reviewed with the patient and appropriate educational materials and side effects teaching were provided. Discharge Orders None URX Announcement We are excited to announce that we are making your provider's discharge notes available to you in URX. You will see these notes when they are completed and signed by the physician that discharged you from your recent hospital stay. If you have any questions or concerns about any information you see in URX, please call the Health Information Department where you were seen or reach out to your Primary Care Provider for more information about your plan of care. Introducing [facility]! Dear Arie Rosen: Thank you for requesting a URX account. Our records indicate that you already have an active URX account. You can access your account anytime at [email or web address] Did you know that you can access your hospital and ER discharge instructions at any time in URX? You can also review all of your test results from your hospital stay or ER visit. Additional Information If you have questions, please visit the Frequently Asked Questions section of the [employer] website at [email or web address] Remember, MyChart is NOT to be used for urgent needs. For medical emergencies, dial 911. Now available from your iPhone and Android! General Information Please provide this summary of care documentation to your next provider. Patient Signature:  ____________________________________________________________ Date:  ____________________________________________________________  
  
Dannielle Trudy Provider Signature:  ____________________________________________________________ Date:  ____________________________________________________________

## 2017-09-27 NOTE — IP AVS SNAPSHOT
[address] [facility] 
 
 
 [address] 
[phone] Patient: Jessica Calzada MRN: [id number] LDW:6/87/2892 You are allergic to the following Allergen Reactions Lisinopril Cough Recent Documentation Height Weight BMI OB Status Smoking Status 1.702 m 83.5 kg 28.85 kg/m2 Postmenopausal Former Smoker Emergency Contacts Name Discharge Info Relation Home Work Mobile Colin Woodruff  Boyfriend [17] [phone] Anshul Jensen  Child [2] [phone] Albina Kulkarni  Child [2] [phone] Juan Manuel Minor  Daughter [21] [phone] About your hospitalization You were admitted on:  September 27, 2017 You last received care in the:  [facility] 6 MED SURG You were discharged on:  September 30, 2017 Why you were hospitalized Your primary diagnosis was:  Lung Cancer, Upper Lobe (Hcc) Providers Seen During Your Hospitalizations Provider Role Specialty Primary office phone Uriel Briones MD Attending Provider General Surgery [phone] Your Primary Care Physician (PCP) Primary Care Physician Office Phone Office Fax [facility] [address] [place] [address], [address] [facility] [phone] Follow-up Information Follow up With Details Comments Contact Info Mary Ann Wright NP   [facility] [address] [place] [address] 
[phone] Uriel Briones MD On 9/30/2017 Call office on Monday to schedule a follow up appointment with Lu Chandler in 7-10 [phone] [facility] [address] [facility] [address] [facility] [address] 
[phone] Your Appointments Tuesday October 24, 2017  9:45 AM EDT  
LAB with [address]  
6797 [facility] OUTREACH INSURANCE [facility] [address] [place] [address]  
[phone] Tuesday October 24, 2017 10:15 AM EDT Follow Up with Jessica Richardson MD  
 [facility]) B[place]KIE/ Garrett Pickard 33 [facility] [address]  
[phone] Current Discharge Medication List  
  
START taking these medications Dose & Instructions Dispensing Information Comments Morning Noon Evening Bedtime  
 oxyCODONE-acetaminophen 7.5-325 mg per tablet Commonly known as:  PERCOCET Your last dose was: Your next dose is:    
   
   
 Dose:  1-2 Tab Take 1-2 Tabs by mouth every four (4) hours as needed for Pain. Max Daily Amount: 12 Tabs. Quantity:  40 Tab Refills:  0 CONTINUE these medications which have CHANGED Dose & Instructions Dispensing Information Comments Morning Noon Evening Bedtime DULoxetine 60 mg capsule Commonly known as:  CYMBALTA What changed:  when to take this Your last dose was: Your next dose is:    
   
   
 Dose:  60 mg Take 1 Cap by mouth daily. Quantity:  90 Cap Refills:  1  
     
   
   
   
  
 hydroCHLOROthiazide 25 mg tablet Commonly known as:  HYDRODIURIL What changed:  when to take this Your last dose was: Your next dose is:    
   
   
 Dose:  25 mg Take 1 Tab by mouth daily. Quantity:  90 Tab Refills:  1  
     
   
   
   
  
 losartan 100 mg tablet Commonly known as:  COZAAR What changed:   
- how much to take - when to take this 
- additional instructions Your last dose was: Your next dose is: TAKE ONE TABLET BY MOUTH EVERY DAY Quantity:  90 Tab Refills:  1  
     
   
   
   
  
 omeprazole 40 mg capsule Commonly known as:  PRILOSEC What changed:  when to take this Your last dose was: Your next dose is:    
   
   
 Dose:  40 mg Take 1 Cap by mouth daily. Indications: HEARTBURN Quantity:  90 Cap Refills:  1 CONTINUE these medications which have NOT CHANGED Dose & Instructions Dispensing Information Comments Morning Noon Evening Bedtime BIOTIN PO Your last dose was: Your next dose is: Take  by mouth. 5,000mcg Refills:  0  
     
   
   
   
  
 CALTRATE 600 + D PO Your last dose was: Your next dose is: Take  by mouth. With Magnesium and Zinc also Refills:  0  
     
   
   
   
  
 celecoxib 200 mg capsule Commonly known as:  CELEBREX Your last dose was: Your next dose is:    
   
   
 Dose:  200 mg Take 1 Cap by mouth two (2) times a day. Quantity:  180 Cap Refills:  5 COLLAGEN PLUS VITAMIN C PO Your last dose was: Your next dose is:    
   
   
 Dose:  6 Tab Take 6 Tabs by mouth daily. Refills:  0  
     
   
   
   
  
 levothyroxine 50 mcg tablet Commonly known as:  SYNTHROID Your last dose was: Your next dose is: TAKE 1 TABLET EVERY DAY  BEFORE  BREAKFAST Quantity:  90 Tab Refills:  1  
     
   
   
   
  
 multivitamins Chew Your last dose was: Your next dose is:    
   
   
 Dose:  1 Tab Take 1 Tab by mouth daily. Refills:  0  
     
   
   
   
  
 OSTEO BI-FLEX PO Your last dose was: Your next dose is: Take  by mouth. Refills:  0  
     
   
   
   
  
 OTHER Your last dose was: Your next dose is:    
   
   
 Go Hermila Go powder taken once a day Refills:  0 PROBIOTIC PO Your last dose was: Your next dose is: Take  by mouth. Refills:  0  
     
   
   
   
  
 raNITIdine 300 mg tablet Commonly known as:  ZANTAC Your last dose was: Your next dose is:    
   
   
 Dose:  300 mg Take 1 Tab by mouth nightly. Indications: HEARTBURN Quantity:  90 Tab Refills:  1  
     
   
   
   
  
 tolterodine ER 4 mg ER capsule Commonly known as:  Elder Jensen  
   
 Your last dose was: Your next dose is:    
   
   
 Dose:  4 mg Take 1 Cap by mouth daily. Indications: INCREASED URINARY FREQUENCY Quantity:  90 Cap Refills:  1  
     
   
   
   
  
 verapamil  mg CR capsule Commonly known as:  Juan Good Your last dose was: Your next dose is:    
   
   
 Dose:  180 mg Take 1 Cap by mouth daily. For Hypertension in addition to Metoprolol XL and Losartan Quantity:  90 Cap Refills:  1 Where to Get Your Medications Information on where to get these meds will be given to you by the nurse or doctor. ! Ask your nurse or doctor about these medications  
  oxyCODONE-acetaminophen 7.5-325 mg per tablet Discharge Instructions DISCHARGE SUMMARY from Nurse The following personal items are in your possession at time of discharge: 
 
Dental Appliances: Lowers, Partials, Uppers Visual Aid: None Jewelry: None Clothing: Shirt, Pants, Footwear Other Valuables: None PATIENT INSTRUCTIONS: 
 
After general anesthesia or intravenous sedation, for 24 hours or while taking prescription Narcotics: · Limit your activities · Do not drive and operate hazardous machinery · Do not make important personal or business decisions · Do  not drink alcoholic beverages · If you have not urinated within 8 hours after discharge, please contact your surgeon on call. Report the following to your surgeon: 
· Excessive pain, swelling, redness or odor of or around the surgical area · Temperature over 100.5 · Nausea and vomiting lasting longer than 4 hours or if unable to take medications · Any signs of decreased circulation or nerve impairment to extremity: change in color, persistent  numbness, tingling, coldness or increase pain · Any questions What to do at Home: 
Recommended activity: Activity as tolerated and no driving for today and No heavy lifting for 3 weeks, *** 
 
 If you experience any of the following symptoms Increased SOB, Bleeding from chest tube incision, Fevers, chills, weakness, please follow up with Dr. Wily Becerra. *  Please give a list of your current medications to your Primary Care Provider. *  Please update this list whenever your medications are discontinued, doses are 
    changed, or new medications (including over-the-counter products) are added. *  Please carry medication information at all times in case of emergency situations. These are general instructions for a healthy lifestyle: No smoking/ No tobacco products/ Avoid exposure to second hand smoke Surgeon General's Warning:  Quitting smoking now greatly reduces serious risk to your health. Obesity, smoking, and sedentary lifestyle greatly increases your risk for illness A healthy diet, regular physical exercise & weight monitoring are important for maintaining a healthy lifestyle You may be retaining fluid if you have a history of heart failure or if you experience any of the following symptoms:  Weight gain of 3 pounds or more overnight or 5 pounds in a week, increased swelling in our hands or feet or shortness of breath while lying flat in bed. Please call your doctor as soon as you notice any of these symptoms; do not wait until your next office visit. Recognize signs and symptoms of STROKE: 
 
F-face looks uneven A-arms unable to move or move unevenly S-speech slurred or non-existent T-time-call 911 as soon as signs and symptoms begin-DO NOT go Back to bed or wait to see if you get better-TIME IS BRAIN. Warning Signs of HEART ATTACK Call 911 if you have these symptoms: 
? Chest discomfort. Most heart attacks involve discomfort in the center of the chest that lasts more than a few minutes, or that goes away and comes back. It can feel like uncomfortable pressure, squeezing, fullness, or pain. ? Discomfort in other areas of the upper body. Symptoms can include pain or discomfort in one or both arms, the back, neck, jaw, or stomach. ? Shortness of breath with or without chest discomfort. ? Other signs may include breaking out in a cold sweat, nausea, or lightheadedness. Don't wait more than five minutes to call [address]! Fast action can save your life. Calling 911 is almost always the fastest way to get lifesaving treatment. Emergency Medical Services staff can begin treatment when they arrive  up to an hour sooner than if someone gets to the hospital by car. The discharge information has been reviewed with the patient. The patient verbalized understanding. Discharge medications reviewed with the patient and appropriate educational materials and side effects teaching were provided. Discharge Orders None General Information Please provide this summary of care documentation to your next provider. Introducing [facility]! Dear Pasha Mccauley: Thank you for requesting a [employer] account. Our records indicate that you already have an active Control4 account. You can access your account anytime at [email or web address] Did you know that you can access your hospital and ER discharge instructions at any time in Control[employer]? You can also review all of your test results from your hospital stay or ER visit. Additional Information If you have questions, please visit the Frequently Asked Questions section of the Control[employer] website at [email or web address] Remember, Control4 is NOT to be used for urgent needs. For medical emergencies, dial 911. Now available from your iPhone and Android! Patient Signature:  ____________________________________________________________ Date:  ____________________________________________________________  
  
Parth Huynh  Provider Signature: ____________________________________________________________ Date:  ____________________________________________________________

## 2017-09-27 NOTE — PERIOP NOTES
TRANSFER - OUT REPORT:    Verbal report given to [facility]  on Yessenia Bradley  being transferred to   for routine progression of care       Report consisted of patients Situation, Background, Assessment and   Recommendations(SBAR). Information from the following report(s) SBAR, OR Summary and MAR was reviewed with the receiving nurse. Lines:   Peripheral IV 09/27/17 Left Hand (Active)   Site Assessment Clean, dry, & intact 9/27/2017  1:24 PM   Phlebitis Assessment 0 9/27/2017  1:24 PM   Infiltration Assessment 0 9/27/2017  1:24 PM   Dressing Status Clean, dry, & intact 9/27/2017  1:24 PM   Dressing Type Tape;Transparent 9/27/2017  1:24 PM   Hub Color/Line Status Green; Infusing 9/27/2017  1:24 PM   Alcohol Cap Used No 9/27/2017 11:20 AM       Peripheral IV 09/27/17 Right Forearm (Active)   Site Assessment Clean, dry, & intact 9/27/2017  1:24 PM   Phlebitis Assessment 0 9/27/2017  1:24 PM   Infiltration Assessment 0 9/27/2017  1:24 PM   Dressing Status Clean, dry, & intact 9/27/2017  1:24 PM   Dressing Type Tape;Transparent 9/27/2017  1:24 PM   Hub Color/Line Status Green;Capped 9/27/2017  1:24 PM   Alcohol Cap Used No 9/27/2017 11:20 AM        Opportunity for questions and clarification was provided. Patient transported with:   O2 @ 4 liters    VTE prophylaxis orders have been written for Yessenia Bradley. Patient and family given floor number and nurses name. Family updated re: pt status after security code verified.

## 2017-09-27 NOTE — ANESTHESIA POSTPROCEDURE EVALUATION
Post-Anesthesia Evaluation and Assessment    Patient: Kelley Hernandez MRN: [id number]  SSN: [id number]    YOB: 1945  Age: 74 y.o. Sex: female       Cardiovascular Function/Vital Signs  Visit Vitals    /54    Pulse 65    Temp 36.1 °C (96.9 °F)    Resp 17    Ht 5' 7\" (1.702 m)    Wt 83.5 kg (184 lb 3 oz)    SpO2 93%    BMI 28.85 kg/m2       Patient is status post general anesthesia for Procedure(s):  RIGHT THORACOSCOPY RIGHT UPPER  LOBECTOMY WITH LYMPHADNECTOMY. Nausea/Vomiting: None    Postoperative hydration reviewed and adequate. Pain:  Pain Scale 1: Numeric (0 - 10) (09/27/17 1312)  Pain Intensity 1: 10 (09/27/17 1312)   Managed    Neurological Status:   Neuro (WDL): Exceptions to WDL (09/27/17 1120)  Neuro  Neurologic State: Drowsy; Eyes open spontaneously (09/27/17 1120)  Orientation Level: Oriented X4 (09/27/17 1120)  Cognition: Follows commands (09/27/17 1120)  Speech: Clear (09/27/17 1120)  LUE Motor Response: Spontaneous  (09/27/17 1120)  LLE Motor Response: Spontaneous  (09/27/17 1120)  RUE Motor Response: Spontaneous  (09/27/17 1120)  RLE Motor Response: Spontaneous  (09/27/17 1120)   At baseline    Mental Status and Level of Consciousness: Arousable    Pulmonary Status:   O2 Device: Nasal cannula (09/27/17 1120)   Adequate oxygenation and airway patent    Complications related to anesthesia: None    Post-anesthesia assessment completed.  No concerns    Signed By: Arjun Silver MD     September 27, 2017

## 2017-09-27 NOTE — BRIEF OP NOTE
BRIEF OPERATIVE NOTE    Date of Procedure: 9/27/2017   Preoperative Diagnosis: Malignant neoplasm of bronchus of right upper lobe (HCC) [C34.11]  Postoperative Diagnosis: Malignant neoplasm of bronchus of right upper lobe (HCC) [C34.11]    Procedure(s):  RIGHT THORACOSCOPY RIGHT UPPER  LOBECTOMY WITH LYMPHADNECTOMY  Surgeon(s) and Role:      Aleksandr Crump MD - Primary     * Sheryl Cao MD - Assisting         Assistant Staff:       Surgical Staff:  Circ-1: Silvia Mckeon RN  Circ-2: Silvina Shah  Scrub Tech-1: Hiram Phillips  Scrub Tech-2: Jaret Carreon  Event Time In   Incision Start 2652   Incision Close 1058     Anesthesia: General   Estimated Blood Loss: 75cc's  Specimens:   ID Type Source Tests Collected by Time Destination   1 : #9 Lymph Node Preservative Lung  Phil Rock MD 9/27/2017 8697 Pathology   2 : intralobar lymph node Preservative Lymph Node  Phil Rock MD 9/27/2017 0930 Pathology   3 : right upper lobe lung Fresh Lung  Phil Rock MD 9/27/2017 1026 Pathology   4 : 4 R lymph node Preservative Lymph Node  Baljitanda StaMD fidel 9/27/2017 1034 Pathology   5 : 7X3 lymph node Preservative Lymph Node  Baljitanda MD Pranav 9/27/2017 1038 Pathology      Findings: see op note   Complications: none  Implants: * No implants in log *

## 2017-09-28 PROCEDURE — 74011000258 HC RX REV CODE- 258: Performed by: SURGERY

## 2017-09-28 PROCEDURE — 65270000029 HC RM PRIVATE

## 2017-09-28 PROCEDURE — 94762 N-INVAS EAR/PLS OXIMTRY CONT: CPT

## 2017-09-28 PROCEDURE — 74011250637 HC RX REV CODE- 250/637: Performed by: SURGERY

## 2017-09-28 PROCEDURE — 74011250636 HC RX REV CODE- 250/636: Performed by: SURGERY

## 2017-09-28 RX ADMIN — ONDANSETRON 4 MG: 2 INJECTION INTRAMUSCULAR; INTRAVENOUS at 21:03

## 2017-09-28 RX ADMIN — Medication 10 ML: at 21:11

## 2017-09-28 RX ADMIN — OXYCODONE HYDROCHLORIDE AND ACETAMINOPHEN 1 TABLET: 10; 325 TABLET ORAL at 01:43

## 2017-09-28 RX ADMIN — DEXTROSE MONOHYDRATE AND SODIUM CHLORIDE 75 ML/HR: 5; .45 INJECTION, SOLUTION INTRAVENOUS at 08:16

## 2017-09-28 RX ADMIN — DULOXETINE HYDROCHLORIDE 60 MG: 60 CAPSULE, DELAYED RELEASE ORAL at 21:03

## 2017-09-28 RX ADMIN — Medication 10 ML: at 14:16

## 2017-09-28 RX ADMIN — SOLIFENACIN SUCCINATE 10 MG: 10 TABLET, FILM COATED ORAL at 09:03

## 2017-09-28 RX ADMIN — ENOXAPARIN SODIUM 40 MG: 40 INJECTION SUBCUTANEOUS at 05:32

## 2017-09-28 RX ADMIN — HYDROMORPHONE HYDROCHLORIDE: 2 INJECTION INTRAMUSCULAR; INTRAVENOUS; SUBCUTANEOUS at 23:44

## 2017-09-28 RX ADMIN — Medication 10 ML: at 05:31

## 2017-09-28 RX ADMIN — HYDROMORPHONE HYDROCHLORIDE 0.5 MG: 1 INJECTION, SOLUTION INTRAMUSCULAR; INTRAVENOUS; SUBCUTANEOUS at 15:12

## 2017-09-28 NOTE — PROGRESS NOTES
POD#1  AVSS  Looks well. On PCA. Both pt and son very concerned with pain management. On a PCA. Uzma regular diet. No air leak from CT's.

## 2017-09-28 NOTE — PROGRESS NOTES
Hourly rounds performed on pt. Pt resting in bed with family at bedside, all needs met. Pt complaining of continuous pain, requested dilaudid 0.5 mg IV at 1512 for break through pain. Still on pca pump as well.

## 2017-09-28 NOTE — PROGRESS NOTES
Hourly rounds done. Pt self medicates with PCA dilaudid, encouraged to push. Administered percocet once at 1:43 am. Denies nausea, vomiting. Chest tubes x2 intact. Chest tube #1 total output 117 mL, reading at 162. Chest tube #2 total output 12 mL, reading at 17. Pt tolerated water & jello. Advanced to regular diet this am, educated Pt on taking it easy. Son at bedside through night. All needs met at this time.

## 2017-09-28 NOTE — PROGRESS NOTES
Pt arrived onto unit with personal belongings and son. Pt oriented to room and surroundings. Dual skin assessment performed with Nena Morton RN. No skin breakdowns noted. IVFs started, SCDs placed, PCA pump functioning. Continuous oxy net placed, not connected to monitoring room (none available). Will continue to monitor.

## 2017-09-29 PROCEDURE — 77030027138 HC INCENT SPIROMETER -A

## 2017-09-29 PROCEDURE — 77010033678 HC OXYGEN DAILY

## 2017-09-29 PROCEDURE — 74011250637 HC RX REV CODE- 250/637: Performed by: SURGERY

## 2017-09-29 PROCEDURE — 94762 N-INVAS EAR/PLS OXIMTRY CONT: CPT

## 2017-09-29 PROCEDURE — 65270000029 HC RM PRIVATE

## 2017-09-29 PROCEDURE — 74011250636 HC RX REV CODE- 250/636: Performed by: SURGERY

## 2017-09-29 RX ORDER — PANTOPRAZOLE SODIUM 40 MG/1
40 TABLET, DELAYED RELEASE ORAL
Status: DISCONTINUED | OUTPATIENT
Start: 2017-09-30 | End: 2017-09-30 | Stop reason: HOSPADM

## 2017-09-29 RX ORDER — LEVOTHYROXINE SODIUM 50 UG/1
50 TABLET ORAL
Status: DISCONTINUED | OUTPATIENT
Start: 2017-09-30 | End: 2017-09-30 | Stop reason: HOSPADM

## 2017-09-29 RX ADMIN — SOLIFENACIN SUCCINATE 10 MG: 10 TABLET, FILM COATED ORAL at 08:49

## 2017-09-29 RX ADMIN — DULOXETINE HYDROCHLORIDE 60 MG: 60 CAPSULE, DELAYED RELEASE ORAL at 21:29

## 2017-09-29 RX ADMIN — Medication 10 ML: at 14:07

## 2017-09-29 RX ADMIN — ONDANSETRON 4 MG: 2 INJECTION INTRAMUSCULAR; INTRAVENOUS at 18:04

## 2017-09-29 RX ADMIN — Medication 10 ML: at 05:51

## 2017-09-29 RX ADMIN — ENOXAPARIN SODIUM 40 MG: 40 INJECTION SUBCUTANEOUS at 05:52

## 2017-09-29 NOTE — PROGRESS NOTES
PLAN:  IVF - D5% - .45% NaCl @ 75cc/hr  Regular diet  CT x 2 - pull 1 CT  CT to water seal  PCA - decrease  Encourage IS  Protonix 40mg daily  Remove remaining CT tomorrow and possibly home    ASSESSMENT:  Admit Date: 9/27/2017   2 Days Post-Op  Procedure(s):  RIGHT THORACOSCOPY RIGHT UPPER  LOBECTOMY WITH LYMPHADNECTOMY    Active Problems:    Lung cancer, upper lobe ([address] (9/27/2017)       SUBJECTIVE:  Pt resting in bed. No new complaints. AF, VSS. OBJECTIVE:  Constitutional: Alert, cooperative,no acute distress; appears stated age   Visit Vitals    /60 (BP 1 Location: Left arm, BP Patient Position: At rest;Sitting)    Pulse 86    Temp 98.1 °F (36.7 °C)    Resp 16    Ht 5' 7\" (1.702 m)    Wt 184 lb 3 oz (83.5 kg)    SpO2 96%    BMI 28.85 kg/m2     Eyes:Sclera are clear. ENMT: no external lesions gross hearing normal; no obvious neck masses, no ear or lip lesions  CV: RRR. Normal perfusion  Resp: No JVD. Breathing is  non-labored; no audible wheezing. CT x 2 no air leak  GI: soft and non-distended     Musculoskeletal: unremarkable with normal function. No embolic signs or cyanosis. Neuro:  Oriented; moves all 4; no focal deficits  Psychiatric: normal affect and mood, no memory impairment      Patient Vitals for the past 24 hrs:   BP Temp Pulse Resp SpO2   09/29/17 1509 117/60 98.1 °F (36.7 °C) 86 16 96 %   09/29/17 1139 119/71 98.3 °F (36.8 °C) 85 18 95 %   09/29/17 0832 - - - - 94 %   09/29/17 0732 120/69 98.3 °F (36.8 °C) 76 16 98 %   09/29/17 0559 - - - - 98 %   09/29/17 0359 133/78 98.9 °F (37.2 °C) 78 18 95 %   09/28/17 2325 111/64 96 °F (35.6 °C) 74 18 95 %   09/28/17 1851 110/65 98.3 °F (36.8 °C) 71 20 94 %     9/27/17 Portable chest xray       Comparison: none.     Indication: Status post right upper lobe lung surgery     Findings: Mild cardiac enlargement. Dual right chest tubes unremarkable in  positioning. Pulmonary congestion with trace pleural effusions.  No pneumothorax  identified. No discrete osseous abnormality on the single view.     IMPRESSION: No discrete acute postoperative sequelae. Specifically, no large  pneumothorax or lobar consolidation. Ashu Beaver, RUSLANP-BC    The patient was seen in conjunction with Dr. Juli Conrad who independently evaluated the patient, reviewed the chart and agreed with the assessment and plan.

## 2017-09-29 NOTE — PROGRESS NOTES
Hourly rounds complete with this pt. Pt c/o upset stomach/ indigestion during the given zofran, pt tolerated well, pt son request levothyroxine  and omeprazole be added to back to pt med list. Pt rested quietly during the night without complaint.

## 2017-09-29 NOTE — PROGRESS NOTES
Hourly rounds performed on pt. Pt resting in bed, family at bedside. PCA dosage decreased, no further complaints of pain. Pt stated pca dosage is keeping her pain under control. Assisted pt to chair for a couple hours today. Will continue to monitor pt during shift.

## 2017-09-30 ENCOUNTER — APPOINTMENT (OUTPATIENT)
Dept: GENERAL RADIOLOGY | Age: 72
DRG: 165 | End: 2017-09-30
Attending: NURSE PRACTITIONER
Payer: MEDICARE

## 2017-09-30 VITALS
HEART RATE: 74 BPM | DIASTOLIC BLOOD PRESSURE: 74 MMHG | HEIGHT: 67 IN | OXYGEN SATURATION: 95 % | SYSTOLIC BLOOD PRESSURE: 121 MMHG | RESPIRATION RATE: 18 BRPM | BODY MASS INDEX: 28.91 KG/M2 | WEIGHT: 184.19 LBS | TEMPERATURE: 99.4 F

## 2017-09-30 PROBLEM — C34.10 LUNG CANCER, UPPER LOBE (HCC): Chronic | Status: ACTIVE | Noted: 2017-09-27

## 2017-09-30 LAB — PLATELET # BLD AUTO: 257 K/UL (ref 150–450)

## 2017-09-30 PROCEDURE — 71010 XR CHEST SNGL V: CPT

## 2017-09-30 PROCEDURE — 85049 AUTOMATED PLATELET COUNT: CPT | Performed by: SURGERY

## 2017-09-30 PROCEDURE — 74011250637 HC RX REV CODE- 250/637: Performed by: SURGERY

## 2017-09-30 PROCEDURE — 36415 COLL VENOUS BLD VENIPUNCTURE: CPT | Performed by: SURGERY

## 2017-09-30 PROCEDURE — 77010033678 HC OXYGEN DAILY

## 2017-09-30 PROCEDURE — 74011250636 HC RX REV CODE- 250/636: Performed by: SURGERY

## 2017-09-30 PROCEDURE — 94762 N-INVAS EAR/PLS OXIMTRY CONT: CPT

## 2017-09-30 RX ORDER — OXYCODONE AND ACETAMINOPHEN 10; 325 MG/1; MG/1
1 TABLET ORAL
Qty: 40 TAB | Refills: 0 | Status: SHIPPED | OUTPATIENT
Start: 2017-09-30 | End: 2017-10-11

## 2017-09-30 RX ORDER — OXYCODONE AND ACETAMINOPHEN 7.5; 325 MG/1; MG/1
1-2 TABLET ORAL
Qty: 40 TAB | Refills: 0 | Status: SHIPPED
Start: 2017-09-30 | End: 2017-10-11

## 2017-09-30 RX ADMIN — Medication 10 ML: at 00:14

## 2017-09-30 RX ADMIN — ENOXAPARIN SODIUM 40 MG: 40 INJECTION SUBCUTANEOUS at 06:07

## 2017-09-30 RX ADMIN — OXYCODONE HYDROCHLORIDE AND ACETAMINOPHEN 1 TABLET: 10; 325 TABLET ORAL at 13:42

## 2017-09-30 RX ADMIN — PANTOPRAZOLE SODIUM 40 MG: 40 TABLET, DELAYED RELEASE ORAL at 06:07

## 2017-09-30 RX ADMIN — SOLIFENACIN SUCCINATE 10 MG: 10 TABLET, FILM COATED ORAL at 08:36

## 2017-09-30 RX ADMIN — LEVOTHYROXINE SODIUM 50 MCG: 50 TABLET ORAL at 06:12

## 2017-09-30 RX ADMIN — HYDROCHLOROTHIAZIDE 25 MG: 25 TABLET ORAL at 06:17

## 2017-09-30 RX ADMIN — Medication 10 ML: at 06:30

## 2017-09-30 RX ADMIN — VERAPAMIL HYDROCHLORIDE 180 MG: 180 TABLET, FILM COATED, EXTENDED RELEASE ORAL at 08:36

## 2017-09-30 NOTE — DISCHARGE SUMMARY
Physician Discharge Summary     Patient ID:  Lorraine Del Cid  [id number]  4 y.o.  1945    Admit Date: 9/27/2017     HPI: Lorraine Del Cid is a 74 y.o. female who presents 9/27/17 for a right upper lobectomy. She has a biopsy proven adenocarcinoma. Pulmonary w/u shows she will tolerate a lobectomy. Discharge Date: 9/30/2017    * Admission Diagnoses: Malignant neoplasm of bronchus of right upper lobe [facility]) [C34.11]    * Discharge Diagnoses:    Hospital Problems as of 9/30/2017  Date Reviewed: 8/22/2017          Codes Class Noted - Resolved POA    * (Principal)Lung cancer, upper lobe ([facility] [address] (Chronic) ICD-10-CM: C34.10  ICD-9-CM: 162.3  9/27/2017 - Present Unknown               Admission Condition: Stable    * Discharge Condition: good    * Procedures: Procedure(s):  RIGHT THORACOSCOPY RIGHT UPPER  LOBECTOMY WITH [facility]    * Hospital Course:   Normal hospital course for this procedure. Consults: None    Significant Diagnostic Studies: labs and radiology    * Disposition: Home    Discharge Medications:   Current Discharge Medication List      START taking these medications    Details   oxyCODONE-acetaminophen (PERCOCET) 7.5-325 mg per tablet Take 1-2 Tabs by mouth every four (4) hours as needed for Pain. Max Daily Amount: 12 Tabs. Qty: 40 Tab, Refills: 0         CONTINUE these medications which have NOT CHANGED    Details   levothyroxine (SYNTHROID) 50 mcg tablet TAKE 1 TABLET EVERY DAY  BEFORE  BREAKFAST  Qty: 90 Tab, Refills: 1    Associated Diagnoses: Acquired hypothyroidism      hydroCHLOROthiazide (HYDRODIURIL) 25 mg tablet Take 1 Tab by mouth daily. Qty: 90 Tab, Refills: 1    Associated Diagnoses: Essential hypertension      losartan (COZAAR) 100 mg tablet TAKE ONE TABLET BY MOUTH EVERY DAY  Qty: 90 Tab, Refills: 1    Associated Diagnoses: Essential hypertension with goal blood pressure less than 140/90      verapamil ER (VERELAN) 180 mg CR capsule Take 1 Cap by mouth daily.  For Hypertension in addition to Metoprolol XL and Losartan  Qty: 90 Cap, Refills: 1    Associated Diagnoses: Essential hypertension with goal blood pressure less than 140/90      omeprazole (PRILOSEC) 40 mg capsule Take 1 Cap by mouth daily. Indications: HEARTBURN  Qty: 90 Cap, Refills: 1    Associated Diagnoses: Gastroesophageal reflux disease without esophagitis      raNITIdine (ZANTAC) 300 mg tablet Take 1 Tab by mouth nightly. Indications: HEARTBURN  Qty: 90 Tab, Refills: 1    Associated Diagnoses: Gastroesophageal reflux disease without esophagitis      DULoxetine (CYMBALTA) 60 mg capsule Take 1 Cap by mouth daily. Qty: 90 Cap, Refills: 1    Associated Diagnoses: Generalized osteoarthrosis, involving multiple sites      celecoxib (CELEBREX) 200 mg capsule Take 1 Cap by mouth two (2) times a day. Qty: 180 Cap, Refills: 5    Associated Diagnoses: Osteoarthritis, unspecified osteoarthritis type, unspecified site      tolterodine ER (DETROL LA) 4 mg ER capsule Take 1 Cap by mouth daily. Indications: INCREASED URINARY FREQUENCY  Qty: 90 Cap, Refills: 1    Associated Diagnoses: Urinary frequency      OTHER Go Hermila Go powder taken once a day      ASCORBIC ACID/COLLAGEN HYDR (COLLAGEN PLUS VITAMIN C PO) Take 6 Tabs by mouth daily. multivitamins chew Take 1 Tab by mouth daily. GLUCOSAMINE HCL/CHONDR BIRD A NA (OSTEO BI-FLEX PO) Take  by mouth. Associated Diagnoses: Rheumatoid arthritis (HCC)      LACTOBACILLUS ACIDOPHILUS (PROBIOTIC PO) Take  by mouth. BIOTIN PO Take  by mouth. 5,000mcg      CALCIUM CARBONATE/VITAMIN D3 (CALTRATE 600 + D PO) Take  by mouth. With Magnesium and Zinc also             * Follow-up Care/Patient Instructions:   Activity: Activity as tolerated  Diet: Regular Diet  Wound Care: Keep wound clean and dry    Follow-up Information     Follow up With Details Comments [id number] [facility] [place]   [address] [facility] [id number] [phone]          Discharge Instructions/Follow-up Plans:     MD Instructions:     Follow-up with Dr. Chelsie Muniz in 7-10 days. Call Monday for appt time. [phone]. Keep incisions clean and dry, may remain uncovered. Do not apply lotions, creams or ointments to incisions.     Diet - as tolerated - Regular diet. Activity - ambulate - as tolerated - no heavy lifting >10lb. May shower - no tub baths or soaking/submerging.     No driving while taking narcotics. Do not drink alcohol while taking narcotics.   Resume other home medications.      If problems or questions arise, please call our office at [phone].     Greater than 30 minutes were spent discharging the patient        Signed:  RANJIT Cardona  9/30/2017  4:14 PM

## 2017-09-30 NOTE — PROGRESS NOTES
Patients son wanted to see if patient can wean off of oxygen so decided to see how well she does with out the oxygen. Patient currently is sat at 93% on room air. Patient is also on a pca pump of dilaudid.

## 2017-09-30 NOTE — DISCHARGE INSTRUCTIONS
DISCHARGE SUMMARY from Nurse    The following personal items are in your possession at time of discharge:    Dental Appliances: Lowers, Partials, Uppers  Visual Aid: None        Jewelry: None  Clothing: Shirt, Pants, Footwear  Other Valuables: None             PATIENT INSTRUCTIONS:    After general anesthesia or intravenous sedation, for 24 hours or while taking prescription Narcotics:  · Limit your activities  · Do not drive and operate hazardous machinery  · Do not make important personal or business decisions  · Do  not drink alcoholic beverages  · If you have not urinated within 8 hours after discharge, please contact your surgeon on call. Report the following to your surgeon:  · Excessive pain, swelling, redness or odor of or around the surgical area  · Temperature over 100.5  · Nausea and vomiting lasting longer than 4 hours or if unable to take medications  · Any signs of decreased circulation or nerve impairment to extremity: change in color, persistent  numbness, tingling, coldness or increase pain  · Any questions        What to do at Home:  Recommended activity: Activity as tolerated and no driving for today and No heavy lifting for 3 weeks, ***    If you experience any of the following symptoms Increased SOB, Bleeding from chest tube incision, Fevers, chills, weakness, please follow up with Dr. María Rivera. *  Please give a list of your current medications to your Primary Care Provider. *  Please update this list whenever your medications are discontinued, doses are      changed, or new medications (including over-the-counter products) are added. *  Please carry medication information at all times in case of emergency situations. These are general instructions for a healthy lifestyle:    No smoking/ No tobacco products/ Avoid exposure to second hand smoke    Surgeon General's Warning:  Quitting smoking now greatly reduces serious risk to your health.     Obesity, smoking, and sedentary lifestyle greatly increases your risk for illness    A healthy diet, regular physical exercise & weight monitoring are important for maintaining a healthy lifestyle    You may be retaining fluid if you have a history of heart failure or if you experience any of the following symptoms:  Weight gain of 3 pounds or more overnight or 5 pounds in a week, increased swelling in our hands or feet or shortness of breath while lying flat in bed. Please call your doctor as soon as you notice any of these symptoms; do not wait until your next office visit. Recognize signs and symptoms of STROKE:    F-face looks uneven    A-arms unable to move or move unevenly    S-speech slurred or non-existent    T-time-call 911 as soon as signs and symptoms begin-DO NOT go       Back to bed or wait to see if you get better-TIME IS BRAIN. Warning Signs of HEART ATTACK     Call 911 if you have these symptoms:   Chest discomfort. Most heart attacks involve discomfort in the center of the chest that lasts more than a few minutes, or that goes away and comes back. It can feel like uncomfortable pressure, squeezing, fullness, or pain.  Discomfort in other areas of the upper body. Symptoms can include pain or discomfort in one or both arms, the back, neck, jaw, or stomach.  Shortness of breath with or without chest discomfort.  Other signs may include breaking out in a cold sweat, nausea, or lightheadedness. Don't wait more than five minutes to call 911 - MINUTES MATTER! Fast action can save your life. Calling 911 is almost always the fastest way to get lifesaving treatment. Emergency Medical Services staff can begin treatment when they arrive -- up to an hour sooner than if someone gets to the hospital by car. The discharge information has been reviewed with the patient. The patient verbalized understanding.     Discharge medications reviewed with the patient and appropriate educational materials and side effects teaching were provided.

## 2017-09-30 NOTE — PROGRESS NOTES
PLAN:  DC IVF  Regular diet  DC remaining CT to waterseal  DC PCA  Encourage IS  Protonix 40mg daily  Possibly home later today after f/u CXR    ASSESSMENT:  Admit Date: 9/27/2017   3 Days Post-Op  Procedure(s):  RIGHT THORACOSCOPY RIGHT UPPER  LOBECTOMY WITH LYMPHADNECTOMY    Active Problems:    Lung cancer, upper lobe ([address] (9/27/2017)       SUBJECTIVE:  Pt awake in bed. No new complaints. Family at bedside. AF, VSS    OBJECTIVE:  Constitutional: Alert, cooperative, no acute distress; appears stated age   Visit Vitals    /74 (BP 1 Location: Right arm, BP Patient Position: At rest)    Pulse 74    Temp 99.4 °F (37.4 °C)    Resp 18    Ht 5' 7\" (1.702 m)    Wt 184 lb 3 oz (83.5 kg)    SpO2 95%    BMI 28.85 kg/m2     Eyes:Sclera are clear. ENMT: no external lesions gross hearing normal; no obvious neck masses, no ear or lip lesions  CV: RRR. Normal perfusion  Resp: No JVD. Breathing is  non-labored; no audible wheezing. CT x 1 no air leak  GI: soft and non-distended     Musculoskeletal: unremarkable with normal function. No embolic signs or cyanosis. Neuro:  Oriented; moves all 4; no focal deficits  Psychiatric: normal affect and mood, no memory impairment    Patient Vitals for the past 24 hrs:   BP Temp Pulse Resp SpO2   09/30/17 1512 121/74 99.4 °F (37.4 °C) 74 18 95 %   09/30/17 1134 114/69 97.6 °F (36.4 °C) 74 15 95 %   09/30/17 0842 - - - - 96 %   09/30/17 0736 114/68 98.4 °F (36.9 °C) 76 16 97 %   09/30/17 0410 134/75 98.9 °F (37.2 °C) 82 18 94 %   09/29/17 2304 133/74 98.8 °F (37.1 °C) 79 20 95 %   09/29/17 1932 116/71 98.5 °F (36.9 °C) 84 18 96 %     Labs:  Recent Labs      09/30/17   0551   PLT  257       RANJIT Guzman    The patient was seen in conjunction with Dr. Petra Moon who independently evaluated the patient, reviewed the chart and agreed with the assessment and plan. Patient was seen and examined with NP. I agree with above assessment and plan.      Ivan Mace DO

## 2017-09-30 NOTE — PROGRESS NOTES
PCA stopped 11.3 mg of Dilaudid wasted. Hooks removed pt has not complaints at this time.  Oxycodone 10 mg  given for pain

## 2017-10-24 ENCOUNTER — HOSPITAL ENCOUNTER (OUTPATIENT)
Dept: LAB | Age: 72
Discharge: HOME | End: 2017-10-24
Payer: MEDICARE

## 2017-10-24 DIAGNOSIS — C34.11 CANCER OF UPPER LOBE OF RIGHT LUNG (HCC): ICD-10-CM

## 2017-10-24 LAB
ALBUMIN SERPL-MCNC: 3.8 G/DL (ref 3.2–4.6)
ALBUMIN/GLOB SERPL: 1 {RATIO} (ref 1.2–3.5)
ALP SERPL-CCNC: 104 U/L (ref 50–136)
ALT SERPL-CCNC: 23 U/L (ref 12–65)
ANION GAP SERPL CALC-SCNC: 7 MMOL/L (ref 7–16)
AST SERPL-CCNC: 15 U/L (ref 15–37)
BASOPHILS # BLD: 0.1 K/UL (ref 0–0.2)
BASOPHILS NFR BLD: 1 % (ref 0–2)
BILIRUB SERPL-MCNC: 0.4 MG/DL (ref 0.2–1.1)
BUN SERPL-MCNC: 23 MG/DL (ref 8–23)
CALCIUM SERPL-MCNC: 9.4 MG/DL (ref 8.3–10.4)
CHLORIDE SERPL-SCNC: 98 MMOL/L (ref 98–107)
CO2 SERPL-SCNC: 27 MMOL/L (ref 21–32)
CREAT SERPL-MCNC: 1.14 MG/DL (ref 0.6–1)
DIFFERENTIAL METHOD BLD: ABNORMAL
EOSINOPHIL # BLD: 0.2 K/UL (ref 0–0.8)
EOSINOPHIL NFR BLD: 3 % (ref 0.5–7.8)
ERYTHROCYTE [DISTWIDTH] IN BLOOD BY AUTOMATED COUNT: 13.1 % (ref 11.9–14.6)
GLOBULIN SER CALC-MCNC: 3.9 G/DL (ref 2.3–3.5)
GLUCOSE SERPL-MCNC: 87 MG/DL (ref 65–100)
HCT VFR BLD AUTO: 38.3 % (ref 35.8–46.3)
HGB BLD-MCNC: 12.7 G/DL (ref 11.7–15.4)
LYMPHOCYTES # BLD: 1.4 K/UL (ref 0.5–4.6)
LYMPHOCYTES NFR BLD: 24 % (ref 13–44)
MCH RBC QN AUTO: 30 PG (ref 26.1–32.9)
MCHC RBC AUTO-ENTMCNC: 33.2 G/DL (ref 31.4–35)
MCV RBC AUTO: 90.3 FL (ref 79.6–97.8)
MONOCYTES # BLD: 0.8 K/UL (ref 0.1–1.3)
MONOCYTES NFR BLD: 13 % (ref 4–12)
NEUTS SEG # BLD: 3.5 K/UL (ref 1.7–8.2)
NEUTS SEG NFR BLD: 60 % (ref 43–78)
NRBC # BLD: 0 K/UL (ref 0–0.2)
NRBC BLD-RTO: 0 PER 100 WBC (ref 0–2)
PLATELET # BLD AUTO: 371 K/UL (ref 150–450)
PMV BLD AUTO: 9.4 FL (ref 10.8–14.1)
POTASSIUM SERPL-SCNC: 4.3 MMOL/L (ref 3.5–5.1)
PROT SERPL-MCNC: 7.7 G/DL (ref 6.3–8.2)
RBC # BLD AUTO: 4.24 M/UL (ref 4.05–5.25)
SODIUM SERPL-SCNC: 132 MMOL/L (ref 136–145)
WBC # BLD AUTO: 6 K/UL (ref 4.3–11.1)

## 2017-10-24 PROCEDURE — 80053 COMPREHEN METABOLIC PANEL: CPT | Performed by: INTERNAL MEDICINE

## 2017-10-24 PROCEDURE — 85025 COMPLETE CBC W/AUTO DIFF WBC: CPT | Performed by: INTERNAL MEDICINE

## 2017-10-24 PROCEDURE — 36415 COLL VENOUS BLD VENIPUNCTURE: CPT | Performed by: INTERNAL MEDICINE

## 2017-11-08 ENCOUNTER — HOSPITAL ENCOUNTER (OUTPATIENT)
Dept: GENERAL RADIOLOGY | Age: 72
Discharge: HOME | End: 2017-11-08
Payer: MEDICARE

## 2017-11-08 ENCOUNTER — HOSPITAL ENCOUNTER (OUTPATIENT)
Dept: LAB | Age: 72
Discharge: HOME | End: 2017-11-08
Payer: MEDICARE

## 2017-11-08 DIAGNOSIS — R06.09 DYSPNEA ON EXERTION: ICD-10-CM

## 2017-11-08 DIAGNOSIS — R06.02 SOB (SHORTNESS OF BREATH): ICD-10-CM

## 2017-11-08 LAB — D DIMER PPP FEU-MCNC: 0.85 UG/ML(FEU)

## 2017-11-08 PROCEDURE — 85379 FIBRIN DEGRADATION QUANT: CPT | Performed by: NURSE PRACTITIONER

## 2017-11-08 PROCEDURE — 71020 XR CHEST PA LAT: CPT

## 2017-11-08 PROCEDURE — 36415 COLL VENOUS BLD VENIPUNCTURE: CPT | Performed by: NURSE PRACTITIONER

## 2017-11-08 NOTE — PROGRESS NOTES
Needs CTA of chest--doesn't have to be stat since these are the same symptoms she has had since March. Has CT of A/C/P Dec 11--can move this up and this on .

## 2017-11-17 ENCOUNTER — HOSPITAL ENCOUNTER (OUTPATIENT)
Dept: CT IMAGING | Age: 72
Discharge: HOME | End: 2017-11-17
Attending: INTERNAL MEDICINE
Payer: MEDICARE

## 2017-11-17 ENCOUNTER — HOSPITAL ENCOUNTER (OUTPATIENT)
Dept: CT IMAGING | Age: 72
End: 2017-11-17
Attending: NURSE PRACTITIONER
Payer: MEDICARE

## 2017-11-17 VITALS — BODY MASS INDEX: 28.12 KG/M2 | HEIGHT: 66 IN | WEIGHT: 175 LBS

## 2017-11-17 DIAGNOSIS — C34.11 CANCER OF UPPER LOBE OF RIGHT LUNG (HCC): ICD-10-CM

## 2017-11-17 PROCEDURE — 74011636320 HC RX REV CODE- 636/320: Performed by: INTERNAL MEDICINE

## 2017-11-17 PROCEDURE — 74177 CT ABD & PELVIS W/CONTRAST: CPT

## 2017-11-17 RX ORDER — SODIUM CHLORIDE 0.9 % (FLUSH) 0.9 %
10 SYRINGE (ML) INJECTION
Status: ACTIVE | OUTPATIENT
Start: 2017-11-17 | End: 2017-11-17

## 2017-11-17 RX ADMIN — IOPAMIDOL 100 ML: 755 INJECTION, SOLUTION INTRAVENOUS at 12:24

## 2017-11-30 ENCOUNTER — HOSPITAL ENCOUNTER (OUTPATIENT)
Dept: NON INVASIVE DIAGNOSTICS | Age: 72
Discharge: HOME | End: 2017-11-30
Attending: NURSE PRACTITIONER
Payer: MEDICARE

## 2017-11-30 DIAGNOSIS — R06.09 DYSPNEA ON EXERTION: ICD-10-CM

## 2017-11-30 PROCEDURE — 93306 TTE W/DOPPLER COMPLETE: CPT

## 2017-12-28 ENCOUNTER — HOSPITAL ENCOUNTER (OUTPATIENT)
Dept: LAB | Age: 72
Discharge: HOME | End: 2017-12-28
Payer: MEDICARE

## 2017-12-28 DIAGNOSIS — C34.11 CANCER OF UPPER LOBE OF RIGHT LUNG (HCC): ICD-10-CM

## 2017-12-28 LAB
ALBUMIN SERPL-MCNC: 3.5 G/DL (ref 3.2–4.6)
ALBUMIN/GLOB SERPL: 1 {RATIO} (ref 1.2–3.5)
ALP SERPL-CCNC: 104 U/L (ref 50–136)
ALT SERPL-CCNC: 21 U/L (ref 12–65)
ANION GAP SERPL CALC-SCNC: 9 MMOL/L (ref 7–16)
AST SERPL-CCNC: 15 U/L (ref 15–37)
BASOPHILS # BLD: 0 K/UL (ref 0–0.2)
BASOPHILS NFR BLD: 1 % (ref 0–2)
BILIRUB SERPL-MCNC: 0.3 MG/DL (ref 0.2–1.1)
BUN SERPL-MCNC: 23 MG/DL (ref 8–23)
CALCIUM SERPL-MCNC: 8.8 MG/DL (ref 8.3–10.4)
CHLORIDE SERPL-SCNC: 103 MMOL/L (ref 98–107)
CO2 SERPL-SCNC: 25 MMOL/L (ref 21–32)
CREAT SERPL-MCNC: 0.88 MG/DL (ref 0.6–1)
DIFFERENTIAL METHOD BLD: ABNORMAL
EOSINOPHIL # BLD: 0.2 K/UL (ref 0–0.8)
EOSINOPHIL NFR BLD: 4 % (ref 0.5–7.8)
ERYTHROCYTE [DISTWIDTH] IN BLOOD BY AUTOMATED COUNT: 13.6 % (ref 11.9–14.6)
GLOBULIN SER CALC-MCNC: 3.6 G/DL (ref 2.3–3.5)
GLUCOSE SERPL-MCNC: 87 MG/DL (ref 65–100)
HCT VFR BLD AUTO: 36 % (ref 35.8–46.3)
HGB BLD-MCNC: 11.9 G/DL (ref 11.7–15.4)
LYMPHOCYTES # BLD: 1.7 K/UL (ref 0.5–4.6)
LYMPHOCYTES NFR BLD: 29 % (ref 13–44)
MCH RBC QN AUTO: 30.5 PG (ref 26.1–32.9)
MCHC RBC AUTO-ENTMCNC: 33.1 G/DL (ref 31.4–35)
MCV RBC AUTO: 92.3 FL (ref 79.6–97.8)
MONOCYTES # BLD: 0.7 K/UL (ref 0.1–1.3)
MONOCYTES NFR BLD: 11 % (ref 4–12)
NEUTS SEG # BLD: 3.2 K/UL (ref 1.7–8.2)
NEUTS SEG NFR BLD: 56 % (ref 43–78)
NRBC # BLD: 0 K/UL (ref 0–0.2)
PLATELET # BLD AUTO: 319 K/UL (ref 150–450)
PMV BLD AUTO: 9.5 FL (ref 10.8–14.1)
POTASSIUM SERPL-SCNC: 4.3 MMOL/L (ref 3.5–5.1)
PROT SERPL-MCNC: 7.1 G/DL (ref 6.3–8.2)
RBC # BLD AUTO: 3.9 M/UL (ref 4.05–5.25)
SODIUM SERPL-SCNC: 137 MMOL/L (ref 136–145)
WBC # BLD AUTO: 5.8 K/UL (ref 4.3–11.1)

## 2017-12-28 PROCEDURE — 85025 COMPLETE CBC W/AUTO DIFF WBC: CPT | Performed by: INTERNAL MEDICINE

## 2017-12-28 PROCEDURE — 36415 COLL VENOUS BLD VENIPUNCTURE: CPT | Performed by: INTERNAL MEDICINE

## 2017-12-28 PROCEDURE — 80053 COMPREHEN METABOLIC PANEL: CPT | Performed by: INTERNAL MEDICINE

## 2018-04-12 ENCOUNTER — HOSPITAL ENCOUNTER (OUTPATIENT)
Dept: CT IMAGING | Age: 73
Discharge: HOME | End: 2018-04-12
Attending: INTERNAL MEDICINE
Payer: MEDICARE

## 2018-04-12 DIAGNOSIS — C34.11 MALIGNANT NEOPLASM OF UPPER LOBE OF RIGHT LUNG (HCC): Chronic | ICD-10-CM

## 2018-04-12 LAB — CREAT BLD-MCNC: 1.5 MG/DL (ref 0.8–1.5)

## 2018-04-12 PROCEDURE — 82565 ASSAY OF CREATININE: CPT

## 2018-04-18 ENCOUNTER — HOSPITAL ENCOUNTER (OUTPATIENT)
Dept: CT IMAGING | Age: 73
Discharge: HOME | End: 2018-04-18
Attending: INTERNAL MEDICINE
Payer: MEDICARE

## 2018-04-18 VITALS — HEIGHT: 66 IN | WEIGHT: 185 LBS | BODY MASS INDEX: 29.73 KG/M2

## 2018-04-18 LAB — CREAT BLD-MCNC: 1 MG/DL (ref 0.8–1.5)

## 2018-04-18 PROCEDURE — 74177 CT ABD & PELVIS W/CONTRAST: CPT

## 2018-04-18 PROCEDURE — 74011636320 HC RX REV CODE- 636/320: Performed by: INTERNAL MEDICINE

## 2018-04-18 PROCEDURE — 74011000258 HC RX REV CODE- 258: Performed by: INTERNAL MEDICINE

## 2018-04-18 PROCEDURE — 82565 ASSAY OF CREATININE: CPT

## 2018-04-18 RX ORDER — SODIUM CHLORIDE 0.9 % (FLUSH) 0.9 %
10 SYRINGE (ML) INJECTION
Status: COMPLETED | OUTPATIENT
Start: 2018-04-18 | End: 2018-04-18

## 2018-04-18 RX ADMIN — SODIUM CHLORIDE 100 ML: 900 INJECTION, SOLUTION INTRAVENOUS at 10:25

## 2018-04-18 RX ADMIN — DIATRIZOATE MEGLUMINE AND DIATRIZOATE SODIUM 15 ML: 660; 100 LIQUID ORAL; RECTAL at 10:25

## 2018-04-18 RX ADMIN — Medication 10 ML: at 10:25

## 2018-04-18 RX ADMIN — IOPAMIDOL 100 ML: 755 INJECTION, SOLUTION INTRAVENOUS at 10:25

## 2018-05-03 ENCOUNTER — HOSPITAL ENCOUNTER (OUTPATIENT)
Dept: LAB | Age: 73
Discharge: HOME | End: 2018-05-03
Payer: MEDICARE

## 2018-05-03 DIAGNOSIS — C34.11 MALIGNANT NEOPLASM OF UPPER LOBE OF RIGHT LUNG (HCC): Chronic | ICD-10-CM

## 2018-05-03 LAB
ALBUMIN SERPL-MCNC: 3.8 G/DL (ref 3.2–4.6)
ALBUMIN/GLOB SERPL: 1.1 {RATIO} (ref 1.2–3.5)
ALP SERPL-CCNC: 86 U/L (ref 50–136)
ALT SERPL-CCNC: 24 U/L (ref 12–65)
ANION GAP SERPL CALC-SCNC: 7 MMOL/L (ref 7–16)
AST SERPL-CCNC: 17 U/L (ref 15–37)
BASOPHILS # BLD: 0.1 K/UL (ref 0–0.2)
BASOPHILS NFR BLD: 1 % (ref 0–2)
BILIRUB SERPL-MCNC: 0.5 MG/DL (ref 0.2–1.1)
BUN SERPL-MCNC: 26 MG/DL (ref 8–23)
CALCIUM SERPL-MCNC: 9 MG/DL (ref 8.3–10.4)
CHLORIDE SERPL-SCNC: 99 MMOL/L (ref 98–107)
CO2 SERPL-SCNC: 28 MMOL/L (ref 21–32)
CREAT SERPL-MCNC: 1.03 MG/DL (ref 0.6–1)
DIFFERENTIAL METHOD BLD: ABNORMAL
EOSINOPHIL # BLD: 0.1 K/UL (ref 0–0.8)
EOSINOPHIL NFR BLD: 3 % (ref 0.5–7.8)
ERYTHROCYTE [DISTWIDTH] IN BLOOD BY AUTOMATED COUNT: 13.6 % (ref 11.9–14.6)
GLOBULIN SER CALC-MCNC: 3.6 G/DL (ref 2.3–3.5)
GLUCOSE SERPL-MCNC: 95 MG/DL (ref 65–100)
HCT VFR BLD AUTO: 37.4 % (ref 35.8–46.3)
HGB BLD-MCNC: 12.4 G/DL (ref 11.7–15.4)
LYMPHOCYTES # BLD: 1.1 K/UL (ref 0.5–4.6)
LYMPHOCYTES NFR BLD: 24 % (ref 13–44)
MCH RBC QN AUTO: 30.5 PG (ref 26.1–32.9)
MCHC RBC AUTO-ENTMCNC: 33.2 G/DL (ref 31.4–35)
MCV RBC AUTO: 91.9 FL (ref 79.6–97.8)
MONOCYTES # BLD: 0.6 K/UL (ref 0.1–1.3)
MONOCYTES NFR BLD: 14 % (ref 4–12)
NEUTS SEG # BLD: 2.6 K/UL (ref 1.7–8.2)
NEUTS SEG NFR BLD: 58 % (ref 43–78)
NRBC # BLD: 0 K/UL (ref 0–0.2)
PLATELET # BLD AUTO: 272 K/UL (ref 150–450)
PMV BLD AUTO: 9.3 FL (ref 10.8–14.1)
POTASSIUM SERPL-SCNC: 4.6 MMOL/L (ref 3.5–5.1)
PROT SERPL-MCNC: 7.4 G/DL (ref 6.3–8.2)
RBC # BLD AUTO: 4.07 M/UL (ref 4.05–5.25)
SODIUM SERPL-SCNC: 134 MMOL/L (ref 136–145)
WBC # BLD AUTO: 4.4 K/UL (ref 4.3–11.1)

## 2018-05-03 PROCEDURE — 85025 COMPLETE CBC W/AUTO DIFF WBC: CPT | Performed by: INTERNAL MEDICINE

## 2018-05-03 PROCEDURE — 36415 COLL VENOUS BLD VENIPUNCTURE: CPT | Performed by: INTERNAL MEDICINE

## 2018-05-03 PROCEDURE — 80053 COMPREHEN METABOLIC PANEL: CPT | Performed by: INTERNAL MEDICINE

## 2018-10-11 ENCOUNTER — HOSPITAL ENCOUNTER (OUTPATIENT)
Dept: CT IMAGING | Age: 73
Discharge: HOME | End: 2018-10-11
Attending: INTERNAL MEDICINE
Payer: MEDICARE

## 2018-10-11 DIAGNOSIS — C34.10 MALIGNANT NEOPLASM OF UPPER LOBE OF LUNG, UNSPECIFIED LATERALITY (HCC): Chronic | ICD-10-CM

## 2018-10-11 LAB — CREAT BLD-MCNC: 1 MG/DL (ref 0.8–1.5)

## 2018-10-11 PROCEDURE — 74011000258 HC RX REV CODE- 258: Performed by: INTERNAL MEDICINE

## 2018-10-11 PROCEDURE — 74177 CT ABD & PELVIS W/CONTRAST: CPT

## 2018-10-11 PROCEDURE — 74011636320 HC RX REV CODE- 636/320: Performed by: INTERNAL MEDICINE

## 2018-10-11 PROCEDURE — 82565 ASSAY OF CREATININE: CPT

## 2018-10-11 RX ORDER — SODIUM CHLORIDE 0.9 % (FLUSH) 0.9 %
10 SYRINGE (ML) INJECTION
Status: COMPLETED | OUTPATIENT
Start: 2018-10-11 | End: 2018-10-11

## 2018-10-11 RX ADMIN — SODIUM CHLORIDE 100 ML: 900 INJECTION, SOLUTION INTRAVENOUS at 11:14

## 2018-10-11 RX ADMIN — IOPAMIDOL 100 ML: 755 INJECTION, SOLUTION INTRAVENOUS at 11:14

## 2018-10-11 RX ADMIN — Medication 10 ML: at 11:14

## 2018-10-11 RX ADMIN — DIATRIZOATE MEGLUMINE AND DIATRIZOATE SODIUM 15 ML: 660; 100 LIQUID ORAL; RECTAL at 11:14

## 2018-11-06 ENCOUNTER — HOSPITAL ENCOUNTER (OUTPATIENT)
Dept: LAB | Age: 73
Discharge: HOME | End: 2018-11-06
Payer: MEDICARE

## 2018-11-06 DIAGNOSIS — C34.10 MALIGNANT NEOPLASM OF UPPER LOBE OF LUNG, UNSPECIFIED LATERALITY (HCC): Chronic | ICD-10-CM

## 2018-11-06 LAB
ALBUMIN SERPL-MCNC: 3.6 G/DL (ref 3.2–4.6)
ALBUMIN/GLOB SERPL: 1.1 {RATIO} (ref 1.2–3.5)
ALP SERPL-CCNC: 82 U/L (ref 50–136)
ALT SERPL-CCNC: 22 U/L (ref 12–65)
ANION GAP SERPL CALC-SCNC: 5 MMOL/L (ref 7–16)
AST SERPL-CCNC: 15 U/L (ref 15–37)
BASOPHILS # BLD: 0 K/UL (ref 0–0.2)
BASOPHILS NFR BLD: 1 % (ref 0–2)
BILIRUB SERPL-MCNC: 0.4 MG/DL (ref 0.2–1.1)
BUN SERPL-MCNC: 24 MG/DL (ref 8–23)
CALCIUM SERPL-MCNC: 9.1 MG/DL (ref 8.3–10.4)
CHLORIDE SERPL-SCNC: 102 MMOL/L (ref 98–107)
CO2 SERPL-SCNC: 29 MMOL/L (ref 21–32)
CREAT SERPL-MCNC: 1.21 MG/DL (ref 0.6–1)
DIFFERENTIAL METHOD BLD: ABNORMAL
EOSINOPHIL # BLD: 0.2 K/UL (ref 0–0.8)
EOSINOPHIL NFR BLD: 3 % (ref 0.5–7.8)
ERYTHROCYTE [DISTWIDTH] IN BLOOD BY AUTOMATED COUNT: 13.2 % (ref 11.9–14.6)
GLOBULIN SER CALC-MCNC: 3.4 G/DL (ref 2.3–3.5)
GLUCOSE SERPL-MCNC: 96 MG/DL (ref 65–100)
HCT VFR BLD AUTO: 36.3 % (ref 35.8–46.3)
HGB BLD-MCNC: 11.9 G/DL (ref 11.7–15.4)
IMM GRANULOCYTES # BLD: 0 K/UL (ref 0–0.5)
IMM GRANULOCYTES NFR BLD AUTO: 0 % (ref 0–5)
LYMPHOCYTES # BLD: 1.3 K/UL (ref 0.5–4.6)
LYMPHOCYTES NFR BLD: 27 % (ref 13–44)
MCH RBC QN AUTO: 30.3 PG (ref 26.1–32.9)
MCHC RBC AUTO-ENTMCNC: 32.8 G/DL (ref 31.4–35)
MCV RBC AUTO: 92.4 FL (ref 79.6–97.8)
MONOCYTES # BLD: 0.7 K/UL (ref 0.1–1.3)
MONOCYTES NFR BLD: 14 % (ref 4–12)
NEUTS SEG # BLD: 2.6 K/UL (ref 1.7–8.2)
NEUTS SEG NFR BLD: 55 % (ref 43–78)
NRBC # BLD: 0 K/UL (ref 0–0.2)
PLATELET # BLD AUTO: 269 K/UL (ref 150–450)
PMV BLD AUTO: 9.4 FL (ref 9.4–12.3)
POTASSIUM SERPL-SCNC: 4.3 MMOL/L (ref 3.5–5.1)
PROT SERPL-MCNC: 7 G/DL (ref 6.3–8.2)
RBC # BLD AUTO: 3.93 M/UL (ref 4.05–5.25)
SODIUM SERPL-SCNC: 136 MMOL/L (ref 136–145)
WBC # BLD AUTO: 4.7 K/UL (ref 4.3–11.1)

## 2018-11-06 PROCEDURE — 85025 COMPLETE CBC W/AUTO DIFF WBC: CPT

## 2018-11-06 PROCEDURE — 36415 COLL VENOUS BLD VENIPUNCTURE: CPT

## 2018-11-06 PROCEDURE — 80053 COMPREHEN METABOLIC PANEL: CPT

## 2019-02-20 ENCOUNTER — HOSPITAL ENCOUNTER (OUTPATIENT)
Dept: LAB | Age: 74
Discharge: HOME | End: 2019-02-20
Payer: MEDICARE

## 2019-02-20 DIAGNOSIS — M06.9 RHEUMATOID ARTHRITIS, INVOLVING UNSPECIFIED SITE, UNSPECIFIED RHEUMATOID FACTOR PRESENCE: ICD-10-CM

## 2019-02-20 LAB
CRP SERPL-MCNC: <0.3 MG/DL (ref 0–0.9)
ERYTHROCYTE [SEDIMENTATION RATE] IN BLOOD: 18 MM/HR (ref 0–30)

## 2019-02-20 PROCEDURE — 36415 COLL VENOUS BLD VENIPUNCTURE: CPT

## 2019-02-20 PROCEDURE — 86430 RHEUMATOID FACTOR TEST QUAL: CPT

## 2019-02-20 PROCEDURE — 85652 RBC SED RATE AUTOMATED: CPT

## 2019-02-20 PROCEDURE — 86140 C-REACTIVE PROTEIN: CPT

## 2019-02-20 PROCEDURE — 83520 IMMUNOASSAY QUANT NOS NONAB: CPT

## 2019-02-20 PROCEDURE — 86431 RHEUMATOID FACTOR QUANT: CPT

## 2019-02-20 PROCEDURE — 86038 ANTINUCLEAR ANTIBODIES: CPT

## 2019-02-21 LAB
ANA SER QL: NEGATIVE
C-ANCA TITR SER IF: NORMAL TITER
MYELOPEROXIDASE AB SER IA-ACNC: <9 U/ML (ref 0–9)
P-ANCA ATYPICAL TITR SER IF: NORMAL TITER
P-ANCA TITR SER IF: NORMAL TITER
PROTEINASE3 AB SER IA-ACNC: <3.5 U/ML (ref 0–3.5)

## 2019-02-22 LAB — RHEUMATOID FACT SER QL LA: NEGATIVE

## 2019-03-06 ENCOUNTER — HOSPITAL ENCOUNTER (OUTPATIENT)
Dept: CT IMAGING | Age: 74
Discharge: HOME | End: 2019-03-06
Attending: NURSE PRACTITIONER
Payer: MEDICARE

## 2019-03-06 DIAGNOSIS — Z87.891 PERSONAL HISTORY OF TOBACCO USE: ICD-10-CM

## 2019-03-06 DIAGNOSIS — R06.09 DYSPNEA ON EXERTION: ICD-10-CM

## 2019-03-06 DIAGNOSIS — R05.9 COUGH: ICD-10-CM

## 2019-03-06 DIAGNOSIS — M06.9 RHEUMATOID ARTHRITIS, INVOLVING UNSPECIFIED SITE, UNSPECIFIED RHEUMATOID FACTOR PRESENCE: ICD-10-CM

## 2019-03-06 PROCEDURE — 71250 CT THORAX DX C-: CPT

## 2019-03-06 NOTE — PROGRESS NOTES
Interstitial changes are mild--waiting on CPFT results.   Will discuss with patient at upcoming appt

## 2019-04-16 ENCOUNTER — HOSPITAL ENCOUNTER (OUTPATIENT)
Dept: CT IMAGING | Age: 74
Discharge: HOME | End: 2019-04-16
Attending: INTERNAL MEDICINE
Payer: MEDICARE

## 2019-04-16 DIAGNOSIS — C34.11 MALIGNANT NEOPLASM OF UPPER LOBE OF RIGHT LUNG (HCC): Chronic | ICD-10-CM

## 2019-04-16 LAB — CREAT BLD-MCNC: 1.1 MG/DL (ref 0.8–1.5)

## 2019-04-16 PROCEDURE — 74011000258 HC RX REV CODE- 258: Performed by: INTERNAL MEDICINE

## 2019-04-16 PROCEDURE — 82565 ASSAY OF CREATININE: CPT

## 2019-04-16 PROCEDURE — 74011636320 HC RX REV CODE- 636/320: Performed by: INTERNAL MEDICINE

## 2019-04-16 PROCEDURE — 71260 CT THORAX DX C+: CPT

## 2019-04-16 RX ORDER — SODIUM CHLORIDE 0.9 % (FLUSH) 0.9 %
10 SYRINGE (ML) INJECTION
Status: COMPLETED | OUTPATIENT
Start: 2019-04-16 | End: 2019-04-16

## 2019-04-16 RX ADMIN — IOPAMIDOL 80 ML: 755 INJECTION, SOLUTION INTRAVENOUS at 14:40

## 2019-04-16 RX ADMIN — SODIUM CHLORIDE 100 ML: 900 INJECTION, SOLUTION INTRAVENOUS at 14:40

## 2019-04-16 RX ADMIN — Medication 10 ML: at 14:40

## 2019-05-07 ENCOUNTER — HOSPITAL ENCOUNTER (OUTPATIENT)
Dept: LAB | Age: 74
Discharge: HOME | End: 2019-05-07
Payer: MEDICARE

## 2019-05-07 DIAGNOSIS — Z85.118 HISTORY OF LUNG CANCER: ICD-10-CM

## 2019-05-07 LAB
ALBUMIN SERPL-MCNC: 3.7 G/DL (ref 3.2–4.6)
ALBUMIN/GLOB SERPL: 1 {RATIO} (ref 1.2–3.5)
ALP SERPL-CCNC: 102 U/L (ref 50–136)
ALT SERPL-CCNC: 23 U/L (ref 12–65)
ANION GAP SERPL CALC-SCNC: 5 MMOL/L (ref 7–16)
AST SERPL-CCNC: 20 U/L (ref 15–37)
BASOPHILS # BLD: 0.1 K/UL (ref 0–0.2)
BASOPHILS NFR BLD: 1 % (ref 0–2)
BILIRUB SERPL-MCNC: 0.4 MG/DL (ref 0.2–1.1)
BUN SERPL-MCNC: 23 MG/DL (ref 8–23)
CALCIUM SERPL-MCNC: 9 MG/DL (ref 8.3–10.4)
CHLORIDE SERPL-SCNC: 102 MMOL/L (ref 98–107)
CO2 SERPL-SCNC: 28 MMOL/L (ref 21–32)
CREAT SERPL-MCNC: 1.18 MG/DL (ref 0.6–1)
DIFFERENTIAL METHOD BLD: ABNORMAL
EOSINOPHIL # BLD: 0.2 K/UL (ref 0–0.8)
EOSINOPHIL NFR BLD: 4 % (ref 0.5–7.8)
ERYTHROCYTE [DISTWIDTH] IN BLOOD BY AUTOMATED COUNT: 13.7 % (ref 11.9–14.6)
GLOBULIN SER CALC-MCNC: 3.6 G/DL (ref 2.3–3.5)
GLUCOSE SERPL-MCNC: 109 MG/DL (ref 65–100)
HCT VFR BLD AUTO: 37.3 % (ref 35.8–46.3)
HGB BLD-MCNC: 12.2 G/DL (ref 11.7–15.4)
IMM GRANULOCYTES # BLD AUTO: 0 K/UL (ref 0–0.5)
IMM GRANULOCYTES NFR BLD AUTO: 0 % (ref 0–5)
LYMPHOCYTES # BLD: 1.1 K/UL (ref 0.5–4.6)
LYMPHOCYTES NFR BLD: 21 % (ref 13–44)
MCH RBC QN AUTO: 30 PG (ref 26.1–32.9)
MCHC RBC AUTO-ENTMCNC: 32.7 G/DL (ref 31.4–35)
MCV RBC AUTO: 91.9 FL (ref 79.6–97.8)
MONOCYTES # BLD: 0.7 K/UL (ref 0.1–1.3)
MONOCYTES NFR BLD: 14 % (ref 4–12)
NEUTS SEG # BLD: 3.1 K/UL (ref 1.7–8.2)
NEUTS SEG NFR BLD: 60 % (ref 43–78)
NRBC # BLD: 0 K/UL (ref 0–0.2)
PLATELET # BLD AUTO: 290 K/UL (ref 150–450)
PMV BLD AUTO: 9.4 FL (ref 9.4–12.3)
POTASSIUM SERPL-SCNC: 4.7 MMOL/L (ref 3.5–5.1)
PROT SERPL-MCNC: 7.3 G/DL (ref 6.3–8.2)
RBC # BLD AUTO: 4.06 M/UL (ref 4.05–5.25)
SODIUM SERPL-SCNC: 135 MMOL/L (ref 136–145)
WBC # BLD AUTO: 5.2 K/UL (ref 4.3–11.1)

## 2019-05-07 PROCEDURE — 36415 COLL VENOUS BLD VENIPUNCTURE: CPT

## 2019-05-07 PROCEDURE — 85025 COMPLETE CBC W/AUTO DIFF WBC: CPT

## 2019-05-07 PROCEDURE — 80053 COMPREHEN METABOLIC PANEL: CPT

## 2019-10-30 ENCOUNTER — HOSPITAL ENCOUNTER (OUTPATIENT)
Dept: CT IMAGING | Age: 74
Discharge: HOME | End: 2019-10-30
Attending: INTERNAL MEDICINE
Payer: MEDICARE

## 2019-10-30 VITALS — WEIGHT: 181 LBS | BODY MASS INDEX: 29.09 KG/M2 | HEIGHT: 66 IN

## 2019-10-30 DIAGNOSIS — C34.10 MALIGNANT NEOPLASM OF UPPER LOBE OF LUNG, UNSPECIFIED LATERALITY (HCC): ICD-10-CM

## 2019-10-30 LAB — CREAT BLD-MCNC: 0.9 MG/DL (ref 0.8–1.5)

## 2019-10-30 PROCEDURE — 74011000258 HC RX REV CODE- 258: Performed by: INTERNAL MEDICINE

## 2019-10-30 PROCEDURE — 82565 ASSAY OF CREATININE: CPT

## 2019-10-30 PROCEDURE — 71260 CT THORAX DX C+: CPT

## 2019-10-30 PROCEDURE — 74011636320 HC RX REV CODE- 636/320: Performed by: INTERNAL MEDICINE

## 2019-10-30 RX ORDER — SODIUM CHLORIDE 0.9 % (FLUSH) 0.9 %
10 SYRINGE (ML) INJECTION
Status: COMPLETED | OUTPATIENT
Start: 2019-10-30 | End: 2019-10-30

## 2019-10-30 RX ADMIN — Medication 10 ML: at 10:31

## 2019-10-30 RX ADMIN — IOPAMIDOL 80 ML: 755 INJECTION, SOLUTION INTRAVENOUS at 10:30

## 2019-10-30 RX ADMIN — SODIUM CHLORIDE 100 ML: 900 INJECTION, SOLUTION INTRAVENOUS at 10:31

## 2019-11-07 ENCOUNTER — HOSPITAL ENCOUNTER (OUTPATIENT)
Dept: LAB | Age: 74
Discharge: HOME | End: 2019-11-07
Payer: MEDICARE

## 2019-11-07 DIAGNOSIS — C34.10 MALIGNANT NEOPLASM OF UPPER LOBE OF LUNG, UNSPECIFIED LATERALITY (HCC): ICD-10-CM

## 2019-11-07 LAB
ALBUMIN SERPL-MCNC: 3.9 G/DL (ref 3.2–4.6)
ALBUMIN/GLOB SERPL: 1.1 {RATIO} (ref 1.2–3.5)
ALP SERPL-CCNC: 82 U/L (ref 50–136)
ALT SERPL-CCNC: 22 U/L (ref 12–65)
ANION GAP SERPL CALC-SCNC: 7 MMOL/L (ref 7–16)
AST SERPL-CCNC: 19 U/L (ref 15–37)
BASOPHILS # BLD: 0.1 K/UL (ref 0–0.2)
BASOPHILS NFR BLD: 1 % (ref 0–2)
BILIRUB SERPL-MCNC: 0.6 MG/DL (ref 0.2–1.1)
BUN SERPL-MCNC: 21 MG/DL (ref 8–23)
CALCIUM SERPL-MCNC: 9.3 MG/DL (ref 8.3–10.4)
CHLORIDE SERPL-SCNC: 102 MMOL/L (ref 98–107)
CO2 SERPL-SCNC: 26 MMOL/L (ref 21–32)
CREAT SERPL-MCNC: 0.99 MG/DL (ref 0.6–1)
DIFFERENTIAL METHOD BLD: ABNORMAL
EOSINOPHIL # BLD: 0.1 K/UL (ref 0–0.8)
EOSINOPHIL NFR BLD: 3 % (ref 0.5–7.8)
ERYTHROCYTE [DISTWIDTH] IN BLOOD BY AUTOMATED COUNT: 13.7 % (ref 11.9–14.6)
GLOBULIN SER CALC-MCNC: 3.5 G/DL (ref 2.3–3.5)
GLUCOSE SERPL-MCNC: 94 MG/DL (ref 65–100)
HCT VFR BLD AUTO: 39.4 % (ref 35.8–46.3)
HGB BLD-MCNC: 12.8 G/DL (ref 11.7–15.4)
IMM GRANULOCYTES # BLD AUTO: 0 K/UL (ref 0–0.5)
IMM GRANULOCYTES NFR BLD AUTO: 0 % (ref 0–5)
LYMPHOCYTES # BLD: 1.3 K/UL (ref 0.5–4.6)
LYMPHOCYTES NFR BLD: 25 % (ref 13–44)
MCH RBC QN AUTO: 30.1 PG (ref 26.1–32.9)
MCHC RBC AUTO-ENTMCNC: 32.5 G/DL (ref 31.4–35)
MCV RBC AUTO: 92.7 FL (ref 79.6–97.8)
MONOCYTES # BLD: 0.7 K/UL (ref 0.1–1.3)
MONOCYTES NFR BLD: 13 % (ref 4–12)
NEUTS SEG # BLD: 3.1 K/UL (ref 1.7–8.2)
NEUTS SEG NFR BLD: 59 % (ref 43–78)
NRBC # BLD: 0 K/UL (ref 0–0.2)
PLATELET # BLD AUTO: 283 K/UL (ref 150–450)
PMV BLD AUTO: 9.6 FL (ref 9.4–12.3)
POTASSIUM SERPL-SCNC: 4.4 MMOL/L (ref 3.5–5.1)
PROT SERPL-MCNC: 7.4 G/DL (ref 6.3–8.2)
RBC # BLD AUTO: 4.25 M/UL (ref 4.05–5.25)
SODIUM SERPL-SCNC: 135 MMOL/L (ref 136–145)
WBC # BLD AUTO: 5.3 K/UL (ref 4.3–11.1)

## 2019-11-07 PROCEDURE — 85025 COMPLETE CBC W/AUTO DIFF WBC: CPT

## 2019-11-07 PROCEDURE — 80053 COMPREHEN METABOLIC PANEL: CPT

## 2019-11-27 ENCOUNTER — HOSPITAL ENCOUNTER (OUTPATIENT)
Dept: MAMMOGRAPHY | Age: 74
Discharge: HOME | End: 2019-11-27
Attending: FAMILY MEDICINE
Payer: MEDICARE

## 2019-11-27 DIAGNOSIS — E28.39 ESTROGEN DEFICIENCY: ICD-10-CM

## 2019-11-27 DIAGNOSIS — Z12.31 ENCOUNTER FOR SCREENING MAMMOGRAM FOR HIGH-RISK PATIENT: ICD-10-CM

## 2019-11-27 PROCEDURE — 77063 BREAST TOMOSYNTHESIS BI: CPT

## 2019-11-27 PROCEDURE — 77080 DXA BONE DENSITY AXIAL: CPT

## 2020-05-27 ENCOUNTER — HOSPITAL ENCOUNTER (OUTPATIENT)
Dept: CT IMAGING | Age: 75
Discharge: HOME | End: 2020-05-27
Attending: INTERNAL MEDICINE

## 2020-05-27 DIAGNOSIS — C34.10 MALIGNANT NEOPLASM OF UPPER LOBE OF LUNG, UNSPECIFIED LATERALITY (HCC): ICD-10-CM

## 2020-05-27 RX ORDER — SODIUM CHLORIDE 0.9 % (FLUSH) 0.9 %
10 SYRINGE (ML) INJECTION
Status: COMPLETED | OUTPATIENT
Start: 2020-05-27 | End: 2020-05-27

## 2020-05-27 RX ADMIN — Medication 10 ML: at 09:29

## 2020-06-16 ENCOUNTER — HOSPITAL ENCOUNTER (OUTPATIENT)
Dept: LAB | Age: 75
Discharge: HOME | End: 2020-06-16
Payer: MEDICARE

## 2020-06-16 DIAGNOSIS — C34.11 MALIGNANT NEOPLASM OF UPPER LOBE OF RIGHT LUNG (HCC): ICD-10-CM

## 2020-06-16 LAB
ALBUMIN SERPL-MCNC: 3.7 G/DL (ref 3.2–4.6)
ALBUMIN/GLOB SERPL: 1 {RATIO} (ref 1.2–3.5)
ALP SERPL-CCNC: 101 U/L (ref 50–136)
ALT SERPL-CCNC: 15 U/L (ref 12–65)
ANION GAP SERPL CALC-SCNC: 4 MMOL/L (ref 7–16)
AST SERPL-CCNC: 14 U/L (ref 15–37)
BASOPHILS # BLD: 0.1 K/UL (ref 0–0.2)
BASOPHILS NFR BLD: 1 % (ref 0–2)
BILIRUB SERPL-MCNC: 0.4 MG/DL (ref 0.2–1.1)
BUN SERPL-MCNC: 25 MG/DL (ref 8–23)
CALCIUM SERPL-MCNC: 9 MG/DL (ref 8.3–10.4)
CHLORIDE SERPL-SCNC: 105 MMOL/L (ref 98–107)
CO2 SERPL-SCNC: 28 MMOL/L (ref 21–32)
CREAT SERPL-MCNC: 0.9 MG/DL (ref 0.6–1)
DIFFERENTIAL METHOD BLD: NORMAL
EOSINOPHIL # BLD: 0.2 K/UL (ref 0–0.8)
EOSINOPHIL NFR BLD: 3 % (ref 0.5–7.8)
ERYTHROCYTE [DISTWIDTH] IN BLOOD BY AUTOMATED COUNT: 13.8 % (ref 11.9–14.6)
GLOBULIN SER CALC-MCNC: 3.8 G/DL (ref 2.3–3.5)
GLUCOSE SERPL-MCNC: 100 MG/DL (ref 65–100)
HCT VFR BLD AUTO: 39 % (ref 35.8–46.3)
HGB BLD-MCNC: 12.7 G/DL (ref 11.7–15.4)
IMM GRANULOCYTES # BLD AUTO: 0 K/UL (ref 0–0.5)
IMM GRANULOCYTES NFR BLD AUTO: 0 % (ref 0–5)
LYMPHOCYTES # BLD: 1.2 K/UL (ref 0.5–4.6)
LYMPHOCYTES NFR BLD: 24 % (ref 13–44)
MCH RBC QN AUTO: 29.8 PG (ref 26.1–32.9)
MCHC RBC AUTO-ENTMCNC: 32.6 G/DL (ref 31.4–35)
MCV RBC AUTO: 91.5 FL (ref 79.6–97.8)
MONOCYTES # BLD: 0.6 K/UL (ref 0.1–1.3)
MONOCYTES NFR BLD: 12 % (ref 4–12)
NEUTS SEG # BLD: 3.1 K/UL (ref 1.7–8.2)
NEUTS SEG NFR BLD: 60 % (ref 43–78)
NRBC # BLD: 0 K/UL (ref 0–0.2)
PLATELET # BLD AUTO: 278 K/UL (ref 150–450)
PMV BLD AUTO: 9.8 FL (ref 9.4–12.3)
POTASSIUM SERPL-SCNC: 4.6 MMOL/L (ref 3.5–5.1)
PROT SERPL-MCNC: 7.5 G/DL (ref 6.3–8.2)
RBC # BLD AUTO: 4.26 M/UL (ref 4.05–5.25)
SODIUM SERPL-SCNC: 137 MMOL/L (ref 136–145)
WBC # BLD AUTO: 5.2 K/UL (ref 4.3–11.1)

## 2020-06-16 PROCEDURE — 85025 COMPLETE CBC W/AUTO DIFF WBC: CPT

## 2020-06-16 PROCEDURE — 80053 COMPREHEN METABOLIC PANEL: CPT

## 2020-06-16 PROCEDURE — 36415 COLL VENOUS BLD VENIPUNCTURE: CPT

## 2020-08-04 PROBLEM — G47.34 NOCTURNAL HYPOXIA: Status: ACTIVE | Noted: 2020-08-04

## 2020-12-15 ENCOUNTER — HOSPITAL ENCOUNTER (OUTPATIENT)
Dept: CT IMAGING | Age: 75
Discharge: HOME | End: 2020-12-15
Attending: INTERNAL MEDICINE

## 2020-12-15 DIAGNOSIS — R06.09 DYSPNEA ON EXERTION: ICD-10-CM

## 2020-12-15 RX ORDER — SODIUM CHLORIDE 0.9 % (FLUSH) 0.9 %
10 SYRINGE (ML) INJECTION
Status: COMPLETED | OUTPATIENT
Start: 2020-12-15 | End: 2020-12-15

## 2020-12-15 RX ADMIN — Medication 10 ML: at 09:45

## 2020-12-16 ENCOUNTER — HOSPITAL ENCOUNTER (OUTPATIENT)
Dept: LAB | Age: 75
Discharge: HOME | End: 2020-12-16
Payer: MEDICARE

## 2020-12-16 DIAGNOSIS — C34.10 MALIGNANT NEOPLASM OF UPPER LOBE OF LUNG, UNSPECIFIED LATERALITY (HCC): ICD-10-CM

## 2020-12-16 LAB
ALBUMIN SERPL-MCNC: 3.9 G/DL (ref 3.2–4.6)
ALBUMIN/GLOB SERPL: 1 {RATIO} (ref 1.2–3.5)
ALP SERPL-CCNC: 101 U/L (ref 50–136)
ALT SERPL-CCNC: 16 U/L (ref 12–65)
ANION GAP SERPL CALC-SCNC: 7 MMOL/L (ref 7–16)
AST SERPL-CCNC: 16 U/L (ref 15–37)
BASOPHILS # BLD: 0 K/UL (ref 0–0.2)
BASOPHILS NFR BLD: 0 % (ref 0–2)
BILIRUB SERPL-MCNC: 0.5 MG/DL (ref 0.2–1.1)
BUN SERPL-MCNC: 16 MG/DL (ref 8–23)
CALCIUM SERPL-MCNC: 9.6 MG/DL (ref 8.3–10.4)
CHLORIDE SERPL-SCNC: 104 MMOL/L (ref 98–107)
CO2 SERPL-SCNC: 30 MMOL/L (ref 21–32)
CREAT SERPL-MCNC: 0.8 MG/DL (ref 0.6–1)
DIFFERENTIAL METHOD BLD: ABNORMAL
EOSINOPHIL # BLD: 0.2 K/UL (ref 0–0.8)
EOSINOPHIL NFR BLD: 2 % (ref 0.5–7.8)
ERYTHROCYTE [DISTWIDTH] IN BLOOD BY AUTOMATED COUNT: 13.8 % (ref 11.9–14.6)
GLOBULIN SER CALC-MCNC: 4 G/DL (ref 2.3–3.5)
GLUCOSE SERPL-MCNC: 92 MG/DL (ref 65–100)
HCT VFR BLD AUTO: 41.6 % (ref 35.8–46.3)
HGB BLD-MCNC: 13.4 G/DL (ref 11.7–15.4)
IMM GRANULOCYTES # BLD AUTO: 0 K/UL (ref 0–0.5)
IMM GRANULOCYTES NFR BLD AUTO: 0 % (ref 0–5)
LYMPHOCYTES # BLD: 0.9 K/UL (ref 0.5–4.6)
LYMPHOCYTES NFR BLD: 11 % (ref 13–44)
MCH RBC QN AUTO: 29.6 PG (ref 26.1–32.9)
MCHC RBC AUTO-ENTMCNC: 32.2 G/DL (ref 31.4–35)
MCV RBC AUTO: 91.8 FL (ref 79.6–97.8)
MONOCYTES # BLD: 0.7 K/UL (ref 0.1–1.3)
MONOCYTES NFR BLD: 8 % (ref 4–12)
NEUTS SEG # BLD: 6.8 K/UL (ref 1.7–8.2)
NEUTS SEG NFR BLD: 78 % (ref 43–78)
NRBC # BLD: 0 K/UL (ref 0–0.2)
PLATELET # BLD AUTO: 302 K/UL (ref 150–450)
PMV BLD AUTO: 9.6 FL (ref 9.4–12.3)
POTASSIUM SERPL-SCNC: 4.4 MMOL/L (ref 3.5–5.1)
PROT SERPL-MCNC: 7.9 G/DL (ref 6.3–8.2)
RBC # BLD AUTO: 4.53 M/UL (ref 4.05–5.25)
SODIUM SERPL-SCNC: 141 MMOL/L (ref 136–145)
WBC # BLD AUTO: 8.7 K/UL (ref 4.3–11.1)

## 2020-12-16 PROCEDURE — 85025 COMPLETE CBC W/AUTO DIFF WBC: CPT

## 2020-12-16 PROCEDURE — 36415 COLL VENOUS BLD VENIPUNCTURE: CPT

## 2020-12-16 PROCEDURE — 80053 COMPREHEN METABOLIC PANEL: CPT

## 2021-06-24 ENCOUNTER — HOSPITAL ENCOUNTER (OUTPATIENT)
Dept: LAB | Age: 76
Discharge: HOME | End: 2021-06-24
Payer: MEDICARE

## 2021-06-24 DIAGNOSIS — C34.10 MALIGNANT NEOPLASM OF UPPER LOBE OF LUNG, UNSPECIFIED LATERALITY (HCC): ICD-10-CM

## 2021-06-24 LAB
ALBUMIN SERPL-MCNC: 3.8 G/DL (ref 3.2–4.6)
ALBUMIN/GLOB SERPL: 1 {RATIO} (ref 1.2–3.5)
ALP SERPL-CCNC: 85 U/L (ref 50–136)
ALT SERPL-CCNC: 21 U/L (ref 12–65)
ANION GAP SERPL CALC-SCNC: 6 MMOL/L (ref 7–16)
AST SERPL-CCNC: 19 U/L (ref 15–37)
BASOPHILS # BLD: 0.1 K/UL (ref 0–0.2)
BASOPHILS NFR BLD: 1 % (ref 0–2)
BILIRUB SERPL-MCNC: 0.6 MG/DL (ref 0.2–1.1)
BUN SERPL-MCNC: 23 MG/DL (ref 8–23)
CALCIUM SERPL-MCNC: 9.4 MG/DL (ref 8.3–10.4)
CHLORIDE SERPL-SCNC: 103 MMOL/L (ref 98–107)
CO2 SERPL-SCNC: 28 MMOL/L (ref 21–32)
CREAT SERPL-MCNC: 0.8 MG/DL (ref 0.6–1)
DIFFERENTIAL METHOD BLD: NORMAL
EOSINOPHIL # BLD: 0.2 K/UL (ref 0–0.8)
EOSINOPHIL NFR BLD: 3 % (ref 0.5–7.8)
ERYTHROCYTE [DISTWIDTH] IN BLOOD BY AUTOMATED COUNT: 14 % (ref 11.9–14.6)
GLOBULIN SER CALC-MCNC: 3.7 G/DL (ref 2.3–3.5)
GLUCOSE SERPL-MCNC: 104 MG/DL (ref 65–100)
HCT VFR BLD AUTO: 41.4 % (ref 35.8–46.3)
HGB BLD-MCNC: 13.5 G/DL (ref 11.7–15.4)
IMM GRANULOCYTES # BLD AUTO: 0 K/UL (ref 0–0.5)
IMM GRANULOCYTES NFR BLD AUTO: 1 % (ref 0–5)
LYMPHOCYTES # BLD: 1.6 K/UL (ref 0.5–4.6)
LYMPHOCYTES NFR BLD: 28 % (ref 13–44)
MCH RBC QN AUTO: 30.5 PG (ref 26.1–32.9)
MCHC RBC AUTO-ENTMCNC: 32.6 G/DL (ref 31.4–35)
MCV RBC AUTO: 93.5 FL (ref 79.6–97.8)
MONOCYTES # BLD: 0.6 K/UL (ref 0.1–1.3)
MONOCYTES NFR BLD: 11 % (ref 4–12)
NEUTS SEG # BLD: 3.2 K/UL (ref 1.7–8.2)
NEUTS SEG NFR BLD: 57 % (ref 43–78)
NRBC # BLD: 0 K/UL (ref 0–0.2)
PLATELET # BLD AUTO: 302 K/UL (ref 150–450)
PMV BLD AUTO: 9.7 FL (ref 9.4–12.3)
POTASSIUM SERPL-SCNC: 4.6 MMOL/L (ref 3.5–5.1)
PROT SERPL-MCNC: 7.5 G/DL (ref 6.3–8.2)
RBC # BLD AUTO: 4.43 M/UL (ref 4.05–5.2)
SODIUM SERPL-SCNC: 137 MMOL/L (ref 136–145)
WBC # BLD AUTO: 5.6 K/UL (ref 4.3–11.1)

## 2021-06-24 PROCEDURE — 85025 COMPLETE CBC W/AUTO DIFF WBC: CPT

## 2021-06-24 PROCEDURE — 80053 COMPREHEN METABOLIC PANEL: CPT

## 2021-06-24 PROCEDURE — 36415 COLL VENOUS BLD VENIPUNCTURE: CPT

## 2021-08-03 PROBLEM — I10 HYPERTENSION: Status: RESOLVED | Noted: 2021-08-03 | Resolved: 2021-08-03

## 2021-11-15 ENCOUNTER — TRANSCRIBE ORDER (OUTPATIENT)
Dept: SCHEDULING | Age: 76
End: 2021-11-15

## 2021-11-15 DIAGNOSIS — Z12.31 SCREENING MAMMOGRAM, ENCOUNTER FOR: Primary | ICD-10-CM

## 2021-11-15 DIAGNOSIS — E28.39 ESTROGEN DEFICIENCY: Primary | ICD-10-CM

## 2022-01-04 ENCOUNTER — TRANSCRIBE ORDER (OUTPATIENT)
Dept: SCHEDULING | Age: 77
End: 2022-01-04

## 2022-01-04 DIAGNOSIS — Z12.31 SCREENING MAMMOGRAM FOR HIGH-RISK PATIENT: Primary | ICD-10-CM

## 2022-01-25 ENCOUNTER — HOSPITAL ENCOUNTER (OUTPATIENT)
Dept: CT IMAGING | Age: 77
Discharge: HOME | End: 2022-01-25
Attending: INTERNAL MEDICINE
Payer: MEDICARE

## 2022-01-25 DIAGNOSIS — C34.10 MALIGNANT NEOPLASM OF UPPER LOBE OF LUNG, UNSPECIFIED LATERALITY (HCC): ICD-10-CM

## 2022-01-25 LAB — CREAT BLD-MCNC: 0.97 MG/DL (ref 0.8–1.5)

## 2022-01-25 PROCEDURE — 82565 ASSAY OF CREATININE: CPT

## 2022-01-25 PROCEDURE — 71260 CT THORAX DX C+: CPT

## 2022-01-25 PROCEDURE — 74011000258 HC RX REV CODE- 258: Performed by: INTERNAL MEDICINE

## 2022-01-25 PROCEDURE — 74011000636 HC RX REV CODE- 636: Performed by: INTERNAL MEDICINE

## 2022-01-25 RX ORDER — SODIUM CHLORIDE 0.9 % (FLUSH) 0.9 %
10 SYRINGE (ML) INJECTION
Status: COMPLETED | OUTPATIENT
Start: 2022-01-25 | End: 2022-01-25

## 2022-01-25 RX ADMIN — SODIUM CHLORIDE 100 ML: 9 INJECTION, SOLUTION INTRAVENOUS at 15:32

## 2022-01-25 RX ADMIN — IOPAMIDOL 100 ML: 755 INJECTION, SOLUTION INTRAVENOUS at 15:32

## 2022-01-25 RX ADMIN — Medication 10 ML: at 15:32

## 2022-01-27 ENCOUNTER — HOSPITAL ENCOUNTER (OUTPATIENT)
Dept: LAB | Age: 77
Discharge: HOME | End: 2022-01-27
Payer: MEDICARE

## 2022-01-27 DIAGNOSIS — Z85.118 HISTORY OF LUNG CANCER: ICD-10-CM

## 2022-01-27 LAB
ALBUMIN SERPL-MCNC: 3.5 G/DL (ref 3.2–4.6)
ALBUMIN/GLOB SERPL: 0.9 {RATIO} (ref 1.2–3.5)
ALP SERPL-CCNC: 89 U/L (ref 50–136)
ALT SERPL-CCNC: 17 U/L (ref 12–65)
ANION GAP SERPL CALC-SCNC: 4 MMOL/L (ref 7–16)
AST SERPL-CCNC: 15 U/L (ref 15–37)
BASOPHILS # BLD: 0.1 K/UL (ref 0–0.2)
BASOPHILS NFR BLD: 1 % (ref 0–2)
BILIRUB SERPL-MCNC: 0.5 MG/DL (ref 0.2–1.1)
BUN SERPL-MCNC: 17 MG/DL (ref 8–23)
CALCIUM SERPL-MCNC: 9.6 MG/DL (ref 8.3–10.4)
CHLORIDE SERPL-SCNC: 101 MMOL/L (ref 98–107)
CO2 SERPL-SCNC: 31 MMOL/L (ref 21–32)
CREAT SERPL-MCNC: 0.9 MG/DL (ref 0.6–1)
DIFFERENTIAL METHOD BLD: ABNORMAL
EOSINOPHIL # BLD: 0.2 K/UL (ref 0–0.8)
EOSINOPHIL NFR BLD: 4 % (ref 0.5–7.8)
ERYTHROCYTE [DISTWIDTH] IN BLOOD BY AUTOMATED COUNT: 13.2 % (ref 11.9–14.6)
GLOBULIN SER CALC-MCNC: 3.8 G/DL (ref 2.3–3.5)
GLUCOSE SERPL-MCNC: 105 MG/DL (ref 65–100)
HCT VFR BLD AUTO: 38.1 % (ref 35.8–46.3)
HGB BLD-MCNC: 12.3 G/DL (ref 11.7–15.4)
IMM GRANULOCYTES # BLD AUTO: 0 K/UL (ref 0–0.5)
IMM GRANULOCYTES NFR BLD AUTO: 0 % (ref 0–5)
LYMPHOCYTES # BLD: 1.2 K/UL (ref 0.5–4.6)
LYMPHOCYTES NFR BLD: 27 % (ref 13–44)
MCH RBC QN AUTO: 29.6 PG (ref 26.1–32.9)
MCHC RBC AUTO-ENTMCNC: 32.3 G/DL (ref 31.4–35)
MCV RBC AUTO: 91.6 FL (ref 79.6–97.8)
MONOCYTES # BLD: 0.7 K/UL (ref 0.1–1.3)
MONOCYTES NFR BLD: 14 % (ref 4–12)
NEUTS SEG # BLD: 2.5 K/UL (ref 1.7–8.2)
NEUTS SEG NFR BLD: 54 % (ref 43–78)
NRBC # BLD: 0 K/UL (ref 0–0.2)
PLATELET # BLD AUTO: 301 K/UL (ref 150–450)
PMV BLD AUTO: 9.1 FL (ref 9.4–12.3)
POTASSIUM SERPL-SCNC: 4.2 MMOL/L (ref 3.5–5.1)
PROT SERPL-MCNC: 7.3 G/DL (ref 6.3–8.2)
RBC # BLD AUTO: 4.16 M/UL (ref 4.05–5.2)
SODIUM SERPL-SCNC: 136 MMOL/L (ref 136–145)
WBC # BLD AUTO: 4.6 K/UL (ref 4.3–11.1)

## 2022-01-27 PROCEDURE — 36415 COLL VENOUS BLD VENIPUNCTURE: CPT

## 2022-01-27 PROCEDURE — 85025 COMPLETE CBC W/AUTO DIFF WBC: CPT

## 2022-01-27 PROCEDURE — 80053 COMPREHEN METABOLIC PANEL: CPT

## 2022-02-02 ENCOUNTER — HOSPITAL ENCOUNTER (OUTPATIENT)
Dept: MAMMOGRAPHY | Age: 77
Discharge: HOME | End: 2022-02-02
Attending: INTERNAL MEDICINE
Payer: MEDICARE

## 2022-02-02 DIAGNOSIS — Z12.31 SCREENING MAMMOGRAM FOR HIGH-RISK PATIENT: ICD-10-CM

## 2022-02-02 PROCEDURE — 77063 BREAST TOMOSYNTHESIS BI: CPT

## 2022-02-09 ENCOUNTER — HOSPITAL ENCOUNTER (OUTPATIENT)
Dept: MAMMOGRAPHY | Age: 77
Discharge: HOME | End: 2022-02-09
Attending: INTERNAL MEDICINE
Payer: MEDICARE

## 2022-02-09 DIAGNOSIS — R92.8 ABNORMAL SCREENING MAMMOGRAM: ICD-10-CM

## 2022-02-09 PROCEDURE — 77065 DX MAMMO INCL CAD UNI: CPT

## 2022-02-09 PROCEDURE — 76642 ULTRASOUND BREAST LIMITED: CPT

## 2022-03-18 PROBLEM — R91.8 LUNG MASS: Status: ACTIVE | Noted: 2017-07-20

## 2022-03-18 PROBLEM — C34.11 CANCER OF UPPER LOBE OF RIGHT LUNG (HCC): Status: ACTIVE | Noted: 2017-07-20

## 2022-03-19 PROBLEM — Z87.891 HISTORY OF TOBACCO ABUSE: Status: ACTIVE | Noted: 2017-07-20

## 2022-03-19 PROBLEM — M06.9 RHEUMATOID ARTHRITIS (HCC): Status: ACTIVE | Noted: 2019-02-20

## 2022-03-19 PROBLEM — I10 ESSENTIAL HYPERTENSION: Status: ACTIVE | Noted: 2017-05-10

## 2022-03-20 PROBLEM — G47.34 NOCTURNAL HYPOXIA: Status: ACTIVE | Noted: 2020-08-04

## 2022-03-20 PROBLEM — C34.10 LUNG CANCER, UPPER LOBE (HCC): Status: ACTIVE | Noted: 2017-09-27

## 2022-06-09 DIAGNOSIS — Z90.2 S/P LOBECTOMY OF LUNG: ICD-10-CM

## 2022-06-09 DIAGNOSIS — C34.11 MALIGNANT NEOPLASM OF UPPER LOBE OF RIGHT LUNG (HCC): Primary | ICD-10-CM

## 2022-09-15 ENCOUNTER — TELEPHONE (OUTPATIENT)
Dept: ONCOLOGY | Age: 77
End: 2022-09-15

## 2023-01-03 ENCOUNTER — HOSPITAL ENCOUNTER (OUTPATIENT)
Dept: CT IMAGING | Age: 78
Discharge: HOME | End: 2023-01-06
Payer: MEDICARE

## 2023-01-03 DIAGNOSIS — C34.11 CANCER OF UPPER LOBE OF RIGHT LUNG (HCC): ICD-10-CM

## 2023-01-03 LAB — CREAT BLD-MCNC: 0.8 MG/DL (ref 0.8–1.5)

## 2023-01-03 PROCEDURE — 6360000004 HC RX CONTRAST MEDICATION: Performed by: INTERNAL MEDICINE

## 2023-01-03 PROCEDURE — 82565 ASSAY OF CREATININE: CPT

## 2023-01-03 PROCEDURE — 71260 CT THORAX DX C+: CPT

## 2023-01-03 RX ADMIN — IOPAMIDOL 80 ML: 755 INJECTION, SOLUTION INTRAVENOUS at 08:52

## 2023-01-05 DIAGNOSIS — C34.11 MALIGNANT NEOPLASM OF UPPER LOBE, RIGHT BRONCHUS OR LUNG (HCC): Primary | ICD-10-CM

## 2023-01-10 ENCOUNTER — HOSPITAL ENCOUNTER (OUTPATIENT)
Dept: LAB | Age: 78
Discharge: HOME | End: 2023-01-13
Payer: MEDICARE

## 2023-01-10 ENCOUNTER — OFFICE VISIT (OUTPATIENT)
Dept: ONCOLOGY | Age: 78
End: 2023-01-10
Payer: MEDICARE

## 2023-01-10 VITALS
HEIGHT: 65 IN | DIASTOLIC BLOOD PRESSURE: 77 MMHG | RESPIRATION RATE: 18 BRPM | BODY MASS INDEX: 28.97 KG/M2 | HEART RATE: 62 BPM | TEMPERATURE: 98.5 F | OXYGEN SATURATION: 97 % | SYSTOLIC BLOOD PRESSURE: 140 MMHG | WEIGHT: 173.9 LBS

## 2023-01-10 DIAGNOSIS — C34.11 MALIGNANT NEOPLASM OF UPPER LOBE, RIGHT BRONCHUS OR LUNG (HCC): ICD-10-CM

## 2023-01-10 DIAGNOSIS — Z85.118 PERSONAL HISTORY OF OTHER MALIGNANT NEOPLASM OF BRONCHUS AND LUNG: Primary | ICD-10-CM

## 2023-01-10 LAB
ALBUMIN SERPL-MCNC: 3.6 G/DL (ref 3.2–4.6)
ALBUMIN/GLOB SERPL: 0.9 (ref 0.4–1.6)
ALP SERPL-CCNC: 90 U/L (ref 50–136)
ALT SERPL-CCNC: 19 U/L (ref 12–65)
ANION GAP SERPL CALC-SCNC: 5 MMOL/L (ref 2–11)
AST SERPL-CCNC: 16 U/L (ref 15–37)
BASOPHILS # BLD: 0 K/UL (ref 0–0.2)
BASOPHILS NFR BLD: 1 % (ref 0–2)
BILIRUB SERPL-MCNC: 0.5 MG/DL (ref 0.2–1.1)
BUN SERPL-MCNC: 19 MG/DL (ref 8–23)
CALCIUM SERPL-MCNC: 9.3 MG/DL (ref 8.3–10.4)
CHLORIDE SERPL-SCNC: 100 MMOL/L (ref 101–110)
CO2 SERPL-SCNC: 31 MMOL/L (ref 21–32)
CREAT SERPL-MCNC: 1.2 MG/DL (ref 0.6–1)
DIFFERENTIAL METHOD BLD: NORMAL
EOSINOPHIL # BLD: 0.1 K/UL (ref 0–0.8)
EOSINOPHIL NFR BLD: 2 % (ref 0.5–7.8)
ERYTHROCYTE [DISTWIDTH] IN BLOOD BY AUTOMATED COUNT: 13.5 % (ref 11.9–14.6)
GLOBULIN SER CALC-MCNC: 3.9 G/DL (ref 2.8–4.5)
GLUCOSE SERPL-MCNC: 96 MG/DL (ref 65–100)
HCT VFR BLD AUTO: 39 % (ref 35.8–46.3)
HGB BLD-MCNC: 12.6 G/DL (ref 11.7–15.4)
IMM GRANULOCYTES # BLD AUTO: 0 K/UL (ref 0–0.5)
IMM GRANULOCYTES NFR BLD AUTO: 0 % (ref 0–5)
LYMPHOCYTES # BLD: 1.4 K/UL (ref 0.5–4.6)
LYMPHOCYTES NFR BLD: 22 % (ref 13–44)
MCH RBC QN AUTO: 30.4 PG (ref 26.1–32.9)
MCHC RBC AUTO-ENTMCNC: 32.3 G/DL (ref 31.4–35)
MCV RBC AUTO: 94.2 FL (ref 82–102)
MONOCYTES # BLD: 0.8 K/UL (ref 0.1–1.3)
MONOCYTES NFR BLD: 12 % (ref 4–12)
NEUTS SEG # BLD: 4 K/UL (ref 1.7–8.2)
NEUTS SEG NFR BLD: 63 % (ref 43–78)
NRBC # BLD: 0 K/UL (ref 0–0.2)
PLATELET # BLD AUTO: 269 K/UL (ref 150–450)
PMV BLD AUTO: 9.6 FL (ref 9.4–12.3)
POTASSIUM SERPL-SCNC: 4.6 MMOL/L (ref 3.5–5.1)
PROT SERPL-MCNC: 7.5 G/DL (ref 6.3–8.2)
RBC # BLD AUTO: 4.14 M/UL (ref 4.05–5.2)
SODIUM SERPL-SCNC: 136 MMOL/L (ref 133–143)
WBC # BLD AUTO: 6.2 K/UL (ref 4.3–11.1)

## 2023-01-10 PROCEDURE — G8427 DOCREV CUR MEDS BY ELIG CLIN: HCPCS | Performed by: INTERNAL MEDICINE

## 2023-01-10 PROCEDURE — 3078F DIAST BP <80 MM HG: CPT | Performed by: INTERNAL MEDICINE

## 2023-01-10 PROCEDURE — 99213 OFFICE O/P EST LOW 20 MIN: CPT | Performed by: INTERNAL MEDICINE

## 2023-01-10 PROCEDURE — G8417 CALC BMI ABV UP PARAM F/U: HCPCS | Performed by: INTERNAL MEDICINE

## 2023-01-10 PROCEDURE — 80053 COMPREHEN METABOLIC PANEL: CPT

## 2023-01-10 PROCEDURE — 1123F ACP DISCUSS/DSCN MKR DOCD: CPT | Performed by: INTERNAL MEDICINE

## 2023-01-10 PROCEDURE — 1036F TOBACCO NON-USER: CPT | Performed by: INTERNAL MEDICINE

## 2023-01-10 PROCEDURE — 85025 COMPLETE CBC W/AUTO DIFF WBC: CPT

## 2023-01-10 PROCEDURE — 3077F SYST BP >= 140 MM HG: CPT | Performed by: INTERNAL MEDICINE

## 2023-01-10 PROCEDURE — 1090F PRES/ABSN URINE INCON ASSESS: CPT | Performed by: INTERNAL MEDICINE

## 2023-01-10 PROCEDURE — G8484 FLU IMMUNIZE NO ADMIN: HCPCS | Performed by: INTERNAL MEDICINE

## 2023-01-10 PROCEDURE — G8399 PT W/DXA RESULTS DOCUMENT: HCPCS | Performed by: INTERNAL MEDICINE

## 2023-01-10 PROCEDURE — 36415 COLL VENOUS BLD VENIPUNCTURE: CPT

## 2023-01-10 ASSESSMENT — PATIENT HEALTH QUESTIONNAIRE - PHQ9
SUM OF ALL RESPONSES TO PHQ QUESTIONS 1-9: 0
2. FEELING DOWN, DEPRESSED OR HOPELESS: 0
1. LITTLE INTEREST OR PLEASURE IN DOING THINGS: 0
SUM OF ALL RESPONSES TO PHQ9 QUESTIONS 1 & 2: 0
SUM OF ALL RESPONSES TO PHQ QUESTIONS 1-9: 0

## 2023-01-10 NOTE — PATIENT INSTRUCTIONS
Patient Instructions from Today's Visit    Reason for Visit:  Follow up     Plan: Your CT scan looked good! No evidence of cancer. Now that you have hit the 5 year jamshid, we do not routinely do scans unless you have symptoms. We will discharge you back to your primary care doctor and pulmonologist but we are here if needed. Follow Up:   Follow up if needed    Recent Lab Results:  Hospital Outpatient Visit on 01/10/2023   Component Date Value Ref Range Status    WBC 01/10/2023 6.2  4.3 - 11.1 K/uL Final    RBC 01/10/2023 4.14  4.05 - 5.2 M/uL Final    Hemoglobin 01/10/2023 12.6  11.7 - 15.4 g/dL Final    Hematocrit 01/10/2023 39.0  35.8 - 46.3 % Final    MCV 01/10/2023 94.2  82.0 - 102.0 FL Final    MCH 01/10/2023 30.4  26.1 - 32.9 PG Final    MCHC 01/10/2023 32.3  31.4 - 35.0 g/dL Final    RDW 01/10/2023 13.5  11.9 - 14.6 % Final    Platelets 34/80/8636 269  150 - 450 K/uL Final    MPV 01/10/2023 9.6  9.4 - 12.3 FL Final    nRBC 01/10/2023 0.00  0.0 - 0.2 K/uL Final    **Note: Absolute NRBC parameter is now reported with Hemogram**    Seg Neutrophils 01/10/2023 63  43 - 78 % Final    Lymphocytes 01/10/2023 22  13 - 44 % Final    Monocytes 01/10/2023 12  4.0 - 12.0 % Final    Eosinophils % 01/10/2023 2  0.5 - 7.8 % Final    Basophils 01/10/2023 1  0.0 - 2.0 % Final    Immature Granulocytes 01/10/2023 0  0.0 - 5.0 % Final    Segs Absolute 01/10/2023 4.0  1.7 - 8.2 K/UL Final    Absolute Lymph # 01/10/2023 1.4  0.5 - 4.6 K/UL Final    Absolute Mono # 01/10/2023 0.8  0.1 - 1.3 K/UL Final    Absolute Eos # 01/10/2023 0.1  0.0 - 0.8 K/UL Final    Basophils Absolute 01/10/2023 0.0  0.0 - 0.2 K/UL Final    Absolute Immature Granulocyte 01/10/2023 0.0  0.0 - 0.5 K/UL Final    Differential Type 01/10/2023 AUTOMATED    Final        Treatment Summary has been discussed and given to patient: n/a        -------------------------------------------------------------------------------------------------------------------  Please call our office at [phone] if you have any  of the following symptoms:   Fever of 100.5 or greater  Chills  Shortness of breath  Swelling or pain in one leg    After office hours an answering service is available and will contact a provider for emergencies or if you are experiencing any of the above symptoms. Patient did express an interest in My Chart. My Chart log in information explained on the after visit summary printout at the [facility] desk.     Damon Andersen RN

## 2023-01-10 NOTE — PROGRESS NOTES
Data Source: Patient,  [facility] record. 1/10/2023           Ruthann Christiansen [id number]     76 y.o. 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 As such had CT chest which revealed 3.7 x 1.7 cm mass  in RUL, pleural based. Referred to pulmonology Dr. Gabrielle Ng and underwent bronchoscopy with EBUS FNA of mediastinal lymph nodes on 7/20/2017. Per biopsy report, RUL mass was biopsied along with mini BAL. EBUS subsequently employed with station 7 biopsied  and negative for malignancy. Patient was staged as [id number] (stage 1B non-small cell lung cancer). Final pathology from RUL mass is read as alveoli focally involved w well differential adenocarcinoma with mucinous differentiation, BAL read as suspicious  for malignancy. Of note subcarinal LN FNA negative for malignancy. Patient underwent a PET scan on 8/4/17 showing an unchanged spiculated brady-fissural RUL mass with intense uptake. No evidence of local or metastatic disease. She has been referred to  surgery. Today patient reports some reflux symptoms as well as some chronic dysphagia. Hospice Referral:   NA   Interval History:  1/10/2023: She returns today for follow-up. She still is requiring nocturnal oxygen but otherwise functions normally during the day. She has no cough no shortness of breath. She has no headache and no focal neurologic complaints. Her appetite is good and her weight is stable. She is now greater than 5 years since her tumor resection. CT scanning done prior to today's visit shows no evidence of tumor recurrence. NCCN Distress Score:   -   REVIEW OF SYSTEMS:   As mentioned above, all other systems were reviewed in full and are  negative.         Past Medical History:   Diagnosis Date    Basal cell carcinoma 2001    Diabetes ([facility]     patient is diet controlled and does  not check glucose at home    Esophageal reflux     Fibromyalgia     Gastroesophageal reflux disease without esophagitis 8/10/2016    Generalized osteoarthrosis, involving multiple sites     GERD (gastroesophageal reflux disease)     somewhat controlled by medications    Hypertension     Lung cancer ([facility] 06/2017    carcinoma right upper lung    Menopause     Obstructive chronic bronchitis without exacerbation ([facility]     Other abnormal glucose     HgbA1C 5.9 in 5/13    Other and unspecified special symptom or syndrome, not elsewhere classified 2001    intestinal blockage    Pain in joint, multiple sites     Prediabetes     HgbA1C 5.9 in 5/13    Pure hypercholesterolemia     Unspecified hypothyroidism 5/13    Urinary frequency            Current Outpatient Medications on File Prior to Visit   Medication Sig Dispense Refill    BIOTIN PO Take by mouth      amLODIPine (NORVASC) 5 MG tablet Take 5 mg by mouth daily      celecoxib (CELEBREX) 200 MG capsule Take 200 mg by mouth 2 times daily      Coenzyme Q10 10 MG CAPS Take by mouth daily      DULoxetine (CYMBALTA) 60 MG extended release capsule One po daily      hydroCHLOROthiazide (HYDRODIURIL) 25 MG tablet Take 25 mg by mouth daily      levothyroxine (SYNTHROID) 50 MCG tablet TAKE 1 TABLET EVERY DAY BEFORE BREAKFAST      losartan (COZAAR) 100 MG tablet TAKE ONE TABLET BY MOUTH EVERY DAY      omeprazole (PRILOSEC) 40 MG delayed release capsule Take 40 mg by mouth daily      oxybutynin (DITROPAN-XL) 10 MG extended release tablet Take 10 mg by mouth daily      verapamil (VERELAN) 180 MG extended release capsule Take 180 mg by mouth daily      raNITIdine (ZANTAC) 300 MG tablet Take 300 mg by mouth (Patient not taking: Reported on 1/10/2023)       No current facility-administered medications on file prior to visit.           Social History     Socioeconomic History    Marital status: Single     Spouse name: Not on file    Number of children: Not on file    Years of education: Not on file    Highest education level: Not on file   Occupational History    Not on file   Tobacco Use    Smoking status: Former     Packs/day: 0.50     Types: Cigarettes     Quit date: 1990     Years since quittin.0    Smokeless tobacco: Never   Substance and Sexual Activity    Alcohol use: Yes     Alcohol/week: 2.0 standard drinks    Drug use: No    Sexual activity: Not on file   Other Topics Concern    Not on file   Social History Narrative    Not on file     Social Determinants of Health     Financial Resource Strain: Not on file   Food Insecurity: Not on file   Transportation Needs: Not on file   Physical Activity: Not on file   Stress: Not on file   Social Connections: Not on file   Intimate Partner Violence: Not on file   Housing Stability: Not on file     Family History   Problem Relation Age of Onset    Breast Cancer Neg Hx     Coronary Art Dis Father         MI at 65    Cancer Father         lung    Heart Disease Father     Hypertension Sister     Cancer Mother     Osteoarthritis Other      Allergies   Allergen Reactions    Lisinopril Other (See Comments)     Other reaction(s): cough, Cough-Intolerance                       PHYSICAL EXAMINATION:   General Appearance: Healthy appearing patient in no acute distress   Vitals reviewed.    Visit Vitals  Vitals:    01/10/23 1251   BP: (!) 140/77   Pulse: 62   Resp: 18   Temp: 98.5 °F (36.9 °C)   TempSrc: Oral   SpO2: 97%   Weight: 173 lb 14.4 oz (78.9 kg)   Height: 5' 4.5\" (1.638 m)            HEENT: No oral exam performed..  Neck is supple with no  thyromegaly or JVD noted. Lymph Nodes: No lymphadenopathy noted in the occipital, pre and post auricular, cervical, supra and infraclavicular, axillary, epitrochlear, inguinal, and popliteal region. Breasts: Not examined. Lungs/Thorax: Clear to auscultation, no accessory muscles of respiration being used. Heart: Regular rate and rhythm, normal S1, S2, no appreciable murmurs, rubs, gallops   Abdomen: Soft, nontender, bowel sounds present, no appreciable hepatosplenomegaly, no palpable masses   Extremeties: Good pulses bilaterally, no peripheral edema. Skin: Normal skin tone with no rash, petechiae, ecchymosis noted.    Musculoskeletal: No pain on palpation over bony prominence, no edema, no evidence of gout, no joint or bony deformity   Neurologic: Grossly intact      LABS/IMAGING:     Hospital Outpatient Visit on 01/10/2023   Component Date Value Ref Range Status    WBC 01/10/2023 6.2  4.3 - 11.1 K/uL Final    RBC 01/10/2023 4.14  4.05 - 5.2 M/uL Final    Hemoglobin 01/10/2023 12.6  11.7 - 15.4 g/dL Final    Hematocrit 01/10/2023 39.0  35.8 - 46.3 % Final    MCV 01/10/2023 94.2  82.0 - 102.0 FL Final    MCH 01/10/2023 30.4  26.1 - 32.9 PG Final    MCHC 01/10/2023 32.3  31.4 - 35.0 g/dL Final    RDW 01/10/2023 13.5  11.9 - 14.6 % Final    Platelets 30/03/3595 269  150 - 450 K/uL Final    MPV 01/10/2023 9.6  9.4 - 12.3 FL Final    nRBC 01/10/2023 0.00  0.0 - 0.2 K/uL Final    **Note: Absolute NRBC parameter is now reported with Hemogram**    Seg Neutrophils 01/10/2023 63  43 - 78 % Final    Lymphocytes 01/10/2023 22  13 - 44 % Final    Monocytes 01/10/2023 12  4.0 - 12.0 % Final    Eosinophils % 01/10/2023 2  0.5 - 7.8 % Final    Basophils 01/10/2023 1  0.0 - 2.0 % Final    Immature Granulocytes 01/10/2023 0  0.0 - 5.0 % Final    Segs Absolute 01/10/2023 4.0  1.7 - 8.2 K/UL Final    Absolute Lymph # 01/10/2023 1.4  0.5 - 4.6 K/UL Final    Absolute Mono # 01/10/2023 0.8  0.1 - 1.3 K/UL Final    Absolute Eos # 01/10/2023 0.1  0.0 - 0.8 K/UL Final    Basophils Absolute 01/10/2023 0.0  0.0 - 0.2 K/UL Final    Absolute Immature Granulocyte 01/10/2023 0.0  0.0 - 0.5 K/UL Final    Differential Type 01/10/2023 AUTOMATED    Final         Pathology: 9/27/17           Above results reviewed with patient. 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 As such had CT chest which revealed 3.7 x 1.7 cm mass  in RUL, pleural based. Referred to pulmonology Dr. Barb Pemberton and underwent bronchoscopy with EBUS FNA of mediastinal lymph nodes on 7/20/2017. Per biopsy report, RUL mass was biopsied along with mini BAL. EBUS subsequently employed with station 7 biopsied  and negative for malignancy. Patient was staged as [id number] (stage 1B non-small cell lung cancer). Final pathology from RUL mass is read as alveoli focally involved w well differential adenocarcinoma with mucinous differentiation, BAL read as suspicious  for malignancy. Of note subcarinal LN FNA negative for malignancy. Patient underwent a PET scan on 8/4/17 showing an unchanged spiculated brady-fissural RUL mass with intense uptake. No evidence of local or metastatic disease. She has been referred to  surgery. Today patient reports some reflux symptoms as well as some chronic dysphagia. In summary, elderly pleasant lady, with limited smoking exposure, now with recent diagnosis of  RUL spiculated lesion, biopsy proven to be adenocarcinoma, clinically appears to be Stage 1 disease. Patient's case was discussed at tumor board w recommendations to pursue surgery if possible. PFTs  were felt to be adequate for lobectomy.       10/17: Brain MRI without mets. Seen after recent RT sided VATS w RT upper lobectomy and lymphadenectomy 9/27/17. Making excellent recovery. Final path w mod differentiated adenocarcinoma 2.1 cm, without LVI/PNI, Margins -ve (pleural surface margin described as less than 0.1 cm - d/w surgery, given this is plural surface there would be no more tissue to resect and as such is considered -ve),  6 LN evaluated all -ve. She is also s/p EGD/Kensett: will get GI records for completion. 12/17: Seeing pulmonology for some MACKEY but reports today this has mostly resolved. 2D ECHO showed some mild diastolic dysfunction. Labs unremarkable. CT CAP without recurrence. Simple monitoring continue. EGD/Kensett 9/17 reviewed showing HH, colon diverticulosis and benign polyp.       05/18: Reports doing well. Trying to loose weight. Exam and labs unremarkable. CT CAP w CR.       11/18: No new complaints. Denies any SOB, cough, phlegm or pain. Gaining weight. Labs unremarkable. CT CAP w CR.      5/7/2019: Reports doing well. Now on nocturnal O2 by pulmonology. Labs unremarkable. CT chest with CR. Continue simple monitoring. 11/7/19: Denies any new complaints, planning to see primary care for continued reflux symptoms. Regularly follows with pulmonology. Exam and labs unremarkable, recent CT chest with CR. Continue simple monitoring. 6/16/2020: Continues to do well, denies any new complaints. Recent labs unremarkable. Recent CT chest with continued CR. Continue simple monitoring, will get 1 more CT chest in 6 months time, thereafter annually. 6/24/2021: Denies any new complaints. She had blood work and CT chest 12/20 but was not seen in office due to COVID-19 symptoms. Blood work was unremarkable. CT chest 12/20 without evidence of pulmonary nodule or mass. However interval developing  minimal groundglass infiltrates in left upper lobe noted with findings consistent with COVID-19 infection.   Patient herself reports symptoms were minimal, and self resolved. Today reports doing well. Denies any breathing issues. Denies any new lumps  or bumps. Denies any weight loss. Exam and labs unremarkable. We will simply see her back in 6 months with repeat scans. 1/27/2022: Reports doing well, no new complaints. Recent blood work unremarkable. Recent CT chest without evidence of disease recurrence, previously noted chronic left-sided opacities with interval resolution. Also follows with pulmonology, felt to  have mild ILD. Given continued remission over 4 years from resection, will decrease frequency of visits. 1/10/2023: She returns today for follow-up. She still is requiring nocturnal oxygen but otherwise functions normally during the day. She has no cough no shortness of breath. She has no headache and no focal neurologic complaints. Her appetite is good and her weight is stable. She is now greater than 5 years since her tumor resection. CT scanning done prior to today's visit shows no evidence of tumor recurrence. 1. RT upper lobe Stage 1A lung adenocarcinoma s/p lobectomy/lymphadenectomy 09/17      PLAN:   - As above. simple monitoring. No role for adjuvant chemo per NCCN guidelines (pt in agreement). CT chest every 6 months x 3 years then annually. At this point, she is 5 years since diagnosis. I feel there is no further need for routine follow-up here in the office. No additional scans have been ordered at this time. We would be happy to see her again in the future should the need arise.        Giovanna Rosado MD   [address] [place] [address],[address]   Hematology Oncology   [address] [place] [address]   Office : [phone]   Fax : [phone]

## 2023-01-12 ENCOUNTER — TELEPHONE (OUTPATIENT)
Dept: PULMONOLOGY | Age: 78
End: 2023-01-12

## 2023-01-12 NOTE — TELEPHONE ENCOUNTER
Patient is scheduled for CT scan on 2/1. She just had one in January for [facility]. Wants to know if we can use that one?

## 2023-01-12 NOTE — TELEPHONE ENCOUNTER
Dr. Leah Davies, is it alright to go ahead and cancel the CT in February?  Please advise. // Basia Nam

## 2023-01-19 NOTE — TELEPHONE ENCOUNTER
Spoke with the patient and notified her that I spoke with Dr. Gerardo Grace and he stated that the CT done in January was adequate enough and it is not necessary for her to have another CT scan. Patient understood and did not have any further questions or concerns at this time.  // [facility]

## 2023-02-27 ENCOUNTER — OFFICE VISIT (OUTPATIENT)
Dept: PULMONOLOGY | Age: 78
End: 2023-02-27
Payer: MEDICARE

## 2023-02-27 VITALS
DIASTOLIC BLOOD PRESSURE: 80 MMHG | RESPIRATION RATE: 20 BRPM | BODY MASS INDEX: 28.32 KG/M2 | WEIGHT: 170 LBS | HEIGHT: 65 IN | SYSTOLIC BLOOD PRESSURE: 110 MMHG | HEART RATE: 63 BPM | OXYGEN SATURATION: 95 % | TEMPERATURE: 98 F

## 2023-02-27 DIAGNOSIS — Z87.891 PERSONAL HISTORY OF NICOTINE DEPENDENCE: ICD-10-CM

## 2023-02-27 DIAGNOSIS — J84.9 INTERSTITIAL PULMONARY DISEASE, UNSPECIFIED (HCC): Primary | ICD-10-CM

## 2023-02-27 DIAGNOSIS — Z85.118 PERSONAL HISTORY OF OTHER MALIGNANT NEOPLASM OF BRONCHUS AND LUNG: ICD-10-CM

## 2023-02-27 PROCEDURE — G8399 PT W/DXA RESULTS DOCUMENT: HCPCS | Performed by: INTERNAL MEDICINE

## 2023-02-27 PROCEDURE — 99213 OFFICE O/P EST LOW 20 MIN: CPT | Performed by: INTERNAL MEDICINE

## 2023-02-27 PROCEDURE — 3074F SYST BP LT 130 MM HG: CPT | Performed by: INTERNAL MEDICINE

## 2023-02-27 PROCEDURE — 1090F PRES/ABSN URINE INCON ASSESS: CPT | Performed by: INTERNAL MEDICINE

## 2023-02-27 PROCEDURE — G8417 CALC BMI ABV UP PARAM F/U: HCPCS | Performed by: INTERNAL MEDICINE

## 2023-02-27 PROCEDURE — G8427 DOCREV CUR MEDS BY ELIG CLIN: HCPCS | Performed by: INTERNAL MEDICINE

## 2023-02-27 PROCEDURE — 1123F ACP DISCUSS/DSCN MKR DOCD: CPT | Performed by: INTERNAL MEDICINE

## 2023-02-27 PROCEDURE — 1036F TOBACCO NON-USER: CPT | Performed by: INTERNAL MEDICINE

## 2023-02-27 PROCEDURE — 3079F DIAST BP 80-89 MM HG: CPT | Performed by: INTERNAL MEDICINE

## 2023-02-27 PROCEDURE — G8484 FLU IMMUNIZE NO ADMIN: HCPCS | Performed by: INTERNAL MEDICINE

## 2023-02-27 NOTE — PROGRESS NOTES
Name:  Yuliya George  YOB: 1945   MRN: [id number]      Office Visit: 2/27/2023        ASSESSMENT AND PLAN:  (Medical Decision Making)    Impression: Pt with h/o lung adenocarcinoma s/p RULobectomy Sept 2017. ILD - minimal ILD at periphery of both lungs. cPFT's have been normal to near normal. 6MW without desaturation. Clinically doing well.     -reviewed with her that she is now > 5 years out from lung cancer diagnosis. Quit smoking 30 years ago. Does not qualify for ongoing ct scans.   -ild changes have been minimal and do not seem to have significant impact on her breathing.   -will plan to see her back in 1 year with repeat CXR and in office spirometry at that time. 1. Interstitial pulmonary disease, unspecified ([address]      2. Personal history of other malignant neoplasm of bronchus and lung      3. Personal history of nicotine dependence    No orders of the defined types were placed in this encounter. No orders of the defined types were placed in this encounter. Petra Mcfarland MD    No specialty comments available. Total time for encounter on day of encounter was 20 minutes. This time includes chart prep, review of tests/procedures, review of other provider's notes, documentation and counseling patient regarding disease process and medications. _________________________________________________________________________    HISTORY OF PRESENT ILLNESS:    Ms. Germaine Marsh is a 76 y.o. female who is seen at [facility] today for  Other (Malignant neoplasm of upper lobe, right bronchus or lung)   She has a history of possible RA, RUL adenocarcinoma s/p lobectomy in Sept 2017. CT scans showed mild mosaicism and minimal interstitial lung disease. Negative auto immune labs. Was seen by rheum, did not feel that she had RA. CT scan 1/25/22 without evidence of recurrent disease. Nocturnal hypoxia on 3L O2 at night.    She states that her breathing is doing alright. She still gets short of breath walking but this has been stable. She has occasional cough. We reviewed her CT scan from Jan that was stable with no signs of recurrent cancer. REVIEW OF SYSTEMS: 10 point review of systems is negative except as reported in HPI. PHYSICAL EXAM: Body mass index is 28.73 kg/m². Vitals:    02/27/23 1345   BP: 110/80   Pulse: 63   Resp: 20   Temp: 98 °F (36.7 °C)   TempSrc: Temporal   SpO2: 95%   Weight: 170 lb (77.1 kg)   Height: 5' 4.5\" (1.638 m)         General:   Alert, cooperative, no distress, appears stated age. Eyes:   Conjunctivae/corneas clear. PERRL        Mouth/Throat:  Lips, mucosa, and tongue normal. Teeth and gums normal.        Lungs:     CTA B< no w/r/r     Heart:   Regular rate and rhythm, S1, S2 normal, no murmur, click, rub or gallop. Abdomen:    Soft, non-tender. Extremities:  Extremities normal, atraumatic, no cyanosis or edema. Skin:  Skin color normal. No rashes or lesions     Neurologic:  A&Ox3     DIAGNOSTIC TESTS:                                                                                    LABS:   Lab Results   Component Value Date/Time    WBC 6.2 01/10/2023 12:34 PM    HGB 12.6 01/10/2023 12:34 PM    HCT 39.0 01/10/2023 12:34 PM     01/10/2023 12:34 PM    BRANDEE NEGATIVE 02/20/2019 11:13 AM     Imaging: I performed an independent interpretation of the patient's images. CXR:   XR CHEST STANDARD TWO VW 11/08/2017    Narrative  Two view chest:  11/08/2017    History: History of right upper lobectomy 09/27/2017 for lung carcinoma. Shortness of breath. .    Comparison: 09/30/2017    Findings: The heart and mediastinal silhouette are normal in size and  configuration. There is mild volume loss of the right hemithorax compatible with  lobectomy. There are no airspace consolidations or pleural effusions. There is  no pneumothorax. The pulmonary vascularity is within normal limits.  The  visualized osseous structures are unremarkable. Impression  Impression: No active disease in the chest.    CT Chest:   CT CHEST W CONTRAST 01/03/2023    Narrative  History: Right lung cancer    EXAM: CT chest with IV contrast    TECHNIQUE: Thin section axial CT images are obtained from the thoracic inlet  through the upper abdomen. 80 cc Isovue-370 is administered intravenously  without incident. Radiation dose reduction techniques were used for this study. Our CT scanners use one or all of the following: Automated exposure control,  adjustment of the mA and/or kV according to patient size, use of iterative  reconstruction. COMPARISON: 1/25/2022    FINDINGS: No new mediastinal, hilar, or axillary lymphadenopathy demonstrated. There is emphysematous change present within the lungs. There is mild bibasilar  scarring present. There is stable scarring seen peripherally in the left upper  lobe. No suspicious pulmonary nodules. No pleural or pericardial effusion. Evaluation of the upper abdomen demonstrates multiple calcified granulomas in  the spleen. Stable hepatic cysts present. No aggressive osseous lesions demonstrated. Impression  No significant interval change when compared with the prior study. 1/3/23      Nuclear Medicine:   PET CT SKULL BASE TO MID THIGH 08/04/2017    Narrative  F-18 FDG PET/CT Imaging. Indication: Staging for lung cancer, 74 years Female history of lung cancer,  non-small cell, biopsy of the right upper lobe mass in July 2017    Comparison: CT chest May 24, 2017    Radiopharmaceutical: 17.3 mCi of F-18 FDG. Technique: Delayed PET post oral contrast CT images from skull base to mid  thighs were performed. Patient received 10 cc of Gastroview for oral contrast.  F-18 FDG PET/CT has limited sensitivity for low grade malignancies, small tumor  deposits, mucin producing neoplasms.     Findings: Relatively unchanged perifissural posterior right upper lobe  spiculated mass measuring approximately 2.3 x 2.6 cm, associated with intense  FDG uptake, maximum SUV of 3.3, consistent with patient's known primary  bronchogenic carcinoma. Small simple appearing segment 4 hepatic cyst  unchanged. There is physiologic distribution of FDG in head and neck region,  abdomen and pelvis. No focal uptake identified in the bone. Non diagnostic CT  demonstrates grossly unremarkable heart and mediastinum as well as liver,  spleen, adrenals, kidneys, pancreas and bowel. Impression  Impression:  1. Unchanged spiculated perifissural right upper lobe mass associated with  intense FDG uptake, consistent with patient's known primary bronchogenic  carcinoma. 2.  No PET evidence of local or distant metastatic disease. 3.  Other chronic findings as above. PFTs:   No flowsheet data found. No results found for this or any previous visit. No results found for this or any previous visit. Spirometry:             Date:     09/12/2019   7/15/21                 FVC     2.58-86   2.7-90                 FEV1     2.06-93   2.0-91                 FEV1/FVC     80   74                 FEF 25-75%     2.   1.49-86                 Bronchodilator Response                         TLC        5.                 RV        3.                 DLCO        2.           FeNO: No results found for this or any previous visit. FeNO and Likelihood of Eosinophilic Asthma   Unlikely Intermediate Likely   <25 ppb 25-50 ppb >50ppb   Exercise Oximetry:  Echo: No results found for this or any previous visit from the past 3650 days.     [facility] Reference Info:                                                                                                                    Past Medical History:   Diagnosis Date    Basal cell carcinoma 2001    Diabetes ([address]     patient is diet controlled and does  not check glucose at home    Esophageal reflux     Fibromyalgia     Gastroesophageal reflux disease without esophagitis 8/10/2016 Generalized osteoarthrosis, involving multiple sites     GERD (gastroesophageal reflux disease)     somewhat controlled by medications    Hypertension     Lung cancer ([facility] 2017    carcinoma right upper lung    Menopause     Obstructive chronic bronchitis without exacerbation ([facility]     Other abnormal glucose     HgbA1C 5.9 in     Other and unspecified special symptom or syndrome, not elsewhere classified     intestinal blockage    Pain in joint, multiple sites     Prediabetes     HgbA1C 5.9 in     Pure hypercholesterolemia     Unspecified hypothyroidism     Urinary frequency         Tobacco Use      Smoking status: Former        Packs/day: 0.50        Years: 5.00        Pack years: 2.5        Types: Cigarettes        Quit date: 1990        Years since quittin.1      Smokeless tobacco: Never    Allergies   Allergen Reactions    Lisinopril Other (See Comments)     Other reaction(s): cough, Cough-Intolerance     Current Outpatient Medications   Medication Instructions    amLODIPine (NORVASC) 5 mg, Oral, DAILY    BIOTIN PO Oral    celecoxib (CELEBREX) 200 mg, Oral, 2 TIMES DAILY    Coenzyme Q10 10 MG CAPS Oral, DAILY    DULoxetine (CYMBALTA) 60 MG extended release capsule One po daily    hydroCHLOROthiazide (HYDRODIURIL) 25 mg, Oral, DAILY    levothyroxine (SYNTHROID) 50 MCG tablet TAKE 1 TABLET EVERY DAY BEFORE BREAKFAST    losartan (COZAAR) 100 MG tablet TAKE ONE TABLET BY MOUTH EVERY DAY    omeprazole (PRILOSEC) 40 mg, Oral, DAILY    oxybutynin (DITROPAN-XL) 10 mg, Oral, DAILY    verapamil (VERELAN) 180 mg, Oral, DAILY

## 2023-03-06 ENCOUNTER — TELEPHONE (OUTPATIENT)
Dept: CARDIOLOGY CLINIC | Age: 78
End: 2023-03-06

## 2023-03-06 ENCOUNTER — OFFICE VISIT (OUTPATIENT)
Dept: CARDIOLOGY CLINIC | Age: 78
End: 2023-03-06
Payer: COMMERCIAL

## 2023-03-06 VITALS
DIASTOLIC BLOOD PRESSURE: 82 MMHG | SYSTOLIC BLOOD PRESSURE: 160 MMHG | WEIGHT: 168.4 LBS | BODY MASS INDEX: 28.06 KG/M2 | HEIGHT: 65 IN | HEART RATE: 58 BPM

## 2023-03-06 DIAGNOSIS — R00.1 BRADYCARDIA: ICD-10-CM

## 2023-03-06 DIAGNOSIS — R42 DIZZINESS: ICD-10-CM

## 2023-03-06 DIAGNOSIS — I10 ESSENTIAL HYPERTENSION: Primary | ICD-10-CM

## 2023-03-06 PROCEDURE — 93000 ELECTROCARDIOGRAM COMPLETE: CPT | Performed by: INTERNAL MEDICINE

## 2023-03-06 PROCEDURE — 3079F DIAST BP 80-89 MM HG: CPT | Performed by: INTERNAL MEDICINE

## 2023-03-06 PROCEDURE — 99204 OFFICE O/P NEW MOD 45 MIN: CPT | Performed by: INTERNAL MEDICINE

## 2023-03-06 PROCEDURE — 1123F ACP DISCUSS/DSCN MKR DOCD: CPT | Performed by: INTERNAL MEDICINE

## 2023-03-06 PROCEDURE — 3077F SYST BP >= 140 MM HG: CPT | Performed by: INTERNAL MEDICINE

## 2023-03-06 RX ORDER — SOLIFENACIN SUCCINATE 10 MG/1
TABLET, FILM COATED ORAL
COMMUNITY
Start: 2023-01-30

## 2023-03-06 ASSESSMENT — ENCOUNTER SYMPTOMS
BLURRED VISION: 0
COUGH: 0
SHORTNESS OF BREATH: 0
ORTHOPNEA: 0
NAUSEA: 0
ABDOMINAL PAIN: 0
HEMOPTYSIS: 0
VOMITING: 0
BLOATING: 0
BACK PAIN: 0
DOUBLE VISION: 0

## 2023-03-06 NOTE — PROGRESS NOTES
[facility] CARDIOLOGY  [address] [place] [address], [address]  [place] [address]  PHONE: [phone]    23    NAME:  Poppy Morgan  : 1945  MRN: [id number]         SUBJECTIVE:   Poppy Morgan is a 77 y.o. female seen for a visit regarding the following:     Chief Complaint   Patient presents with    Bradycardia    Hypertension    Hyperlipidemia       HPI:      No prior history of coronary disease.  History of hypertension.  Negative Lexiscan Cardiolite [].  Recent right upper lobe adenocarcinoma s/p lobectomy ().  Echo with preserved EF []. Appears possible ILD on CT chest (3/19).  Normal ABIs [].    Set up evaluation due to episode of dizziness while she was playing [place] golf.  Reported some diaphoresis as well.  Some occasional positional dizziness.  Daughter was a nurse stated there were note that patient had heart rates in the 40s on her watch and was concerned about bradycardia being causative.  Appears during the episode, blood pressure with systolics in the 120s and heart rate at 60.  Reports an episode of dizziness when she was in [place] with heart rates in the 40s and verapamil 180 mg daily was stopped.  Improved bradycardia since  Otherwise, baseline dyspnea on exertion and not significantly changed.  Can do all her ADLs with no issues.  No chest pain.    Prior from evaluation from 2019- Improved dyspnea with increased activity; has some baseline MACKEY. Can walk couple blocks. Denies any exertional chest discomfort.    Does complain of some calf pain with ambulation/also notes some back pain as well; triggered above ABIs which were unremarkable    Prior--Issues with increased MACKEY; noted with activity of over 2 blocks. Mostly stable. Also some chest pain-states noted with eating/swallowing-states PCP discussed GI evaluation with her which she deferred. Also some chest pressure noted while dyspnea noted; transient symptoms.  Appears had some mild bradycardia  and recently had her metoprolol held    Chest pain-negative stress, hypertension-controlled, lung mass-controlled, dyspnea on exertion-stable, dizziness-not controlled, bradycardia-stay    Past Medical History, Past Surgical History, Family history, Social History, and Medications were all reviewed with the patient today and updated as necessary.      Allergies   Allergen Reactions    Lisinopril Other (See Comments)     Other reaction(s): cough, Cough-Intolerance     Patient Active Problem List   Diagnosis    Generalized osteoarthrosis, involving multiple sites    Cancer of upper lobe of right lung (HCC)    Lung mass    Essential hypertension    Acquired hypothyroidism    Prediabetes    Basal cell carcinoma    Pure hypercholesterolemia    Urinary frequency    History of tobacco abuse    Gastroesophageal reflux disease without esophagitis    Rheumatoid arthritis (HCC)    Fibromyalgia    Lung cancer, upper lobe (HCC)    Nocturnal hypoxia     Past Medical History:   Diagnosis Date    Basal cell carcinoma 2001    Diabetes ([address]     patient is diet controlled and does  not check glucose at home    Esophageal reflux     Fibromyalgia     Gastroesophageal reflux disease without esophagitis 8/10/2016    Generalized osteoarthrosis, involving multiple sites     GERD (gastroesophageal reflux disease)     somewhat controlled by medications    Hypertension     Lung cancer ([address] 06/2017    carcinoma right upper lung    Menopause     Obstructive chronic bronchitis without exacerbation ([address]     Other abnormal glucose     HgbA1C 5.9 in 5/13    Other and unspecified special symptom or syndrome, not elsewhere classified 2001    intestinal blockage    Pain in joint, multiple sites     Prediabetes     HgbA1C 5.9 in 5/13    Pure hypercholesterolemia     Unspecified hypothyroidism 5/13    Urinary frequency      Past Surgical History:   Procedure Laterality Date    ANTERIOR CRUCIATE LIGAMENT REPAIR      ACL repair right knee    BREAST BIOPSY Left     BREAST SURGERY      left breast cyst aspiration/stereotactic core biopsy, fibroadenoma (benign)    BUNIONECTOMY      bilateral    CATARACT REMOVAL      bilateral with IOL implants    COLONOSCOPY      CYST REMOVAL      ganglion    ORTHOPEDIC SURGERY      bunion surgery-bilateral    ORTHOPEDIC SURGERY  2016    right hip    OTHER SURGICAL HISTORY      face lift    OTHER SURGICAL HISTORY      varicose veins surgery x2    TOTAL KNEE ARTHROPLASTY  10/05    right     Family History   Problem Relation Age of Onset    Breast Cancer Neg Hx     Coronary Art Dis Father         MI at 72    Cancer Father         lung    Heart Disease Father     Hypertension Sister     Cancer Mother     Osteoarthritis Other      Social History     Tobacco Use    Smoking status: Former     Packs/day: 0.50     Years: 5.00     Pack years: 2.50     Types: Cigarettes     Quit date: 1990     Years since quittin.1    Smokeless tobacco: Never   Substance Use Topics    Alcohol use:  Yes     Alcohol/week: 2.0 standard drinks     Current Outpatient Medications   Medication Sig Dispense Refill    solifenacin (VESICARE) 10 MG tablet       Cholecalciferol (VITAMIN D3 PO) Take by mouth      Multiple Vitamin (ONE-A-DAY 55 PLUS PO) Take by mouth      NONFORMULARY osteo-biflex  [employer]  Nerve Control 911      CALCIUM PO Take by mouth      BIOTIN PO Take by mouth      amLODIPine (NORVASC) 5 MG tablet Take 5 mg by mouth daily      celecoxib (CELEBREX) 200 MG capsule Take 200 mg by mouth 2 times daily      Coenzyme Q10 10 MG CAPS Take by mouth daily      DULoxetine (CYMBALTA) 60 MG extended release capsule One po daily      levothyroxine (SYNTHROID) 50 MCG tablet TAKE 1 TABLET EVERY DAY BEFORE BREAKFAST      losartan (COZAAR) 100 MG tablet TAKE ONE TABLET BY MOUTH EVERY DAY      omeprazole (PRILOSEC) 40 MG delayed release capsule Take 40 mg by mouth daily      oxybutynin (DITROPAN-XL) 10 MG extended release tablet Take 10 mg by mouth daily (Patient not taking: Reported on 3/6/2023)       No current facility-administered medications for this visit. Review of Systems   Constitutional: Negative for chills, decreased appetite, fever, malaise/fatigue and weight gain. HENT:  Negative for nosebleeds. Eyes:  Negative for blurred vision and double vision. Cardiovascular:  Positive for dyspnea on exertion. Negative for chest pain, claudication, leg swelling, orthopnea, palpitations, paroxysmal nocturnal dyspnea and syncope. Respiratory:  Negative for cough, hemoptysis and shortness of breath. Endocrine: Negative for cold intolerance and heat intolerance. Hematologic/Lymphatic: Negative for bleeding problem. Skin:  Negative for rash. Musculoskeletal:  Negative for back pain, joint pain, muscle weakness and myalgias. Gastrointestinal:  Negative for bloating, abdominal pain, nausea and vomiting. Genitourinary:  Negative for dysuria. Neurological:  Negative for dizziness, light-headedness and weakness. Psychiatric/Behavioral:  Negative for altered mental status. PHYSICAL EXAM:    BP (!) 160/82   Pulse 58   Ht 5' 4.5\" (1.638 m)   Wt 168 lb 6.4 oz (76.4 kg)   BMI 28.46 kg/m²      Physical Exam  Constitutional:       Appearance: Normal appearance. HENT:      Head: Normocephalic and atraumatic. Mouth/Throat:      Mouth: Mucous membranes are moist.   Eyes:      Pupils: Pupils are equal, round, and reactive to light. Neck:      Vascular: No carotid bruit. Cardiovascular:      Rate and Rhythm: Normal rate and regular rhythm. Pulses: Normal pulses. Heart sounds: No murmur heard. Pulmonary:      Effort: Pulmonary effort is normal.      Breath sounds: Normal breath sounds. Abdominal:      General: There is no distension. Palpations: Abdomen is soft. Tenderness: There is no abdominal tenderness. Musculoskeletal:         General: No swelling.       Cervical back: Normal range of motion. Skin:     General: Skin is warm and dry. Neurological:      General: No focal deficit present. Mental Status: She is alert. Psychiatric:         Mood and Affect: Mood normal.       Medical problems and test results were reviewed with the patient today. Recent Results (from the past 672 hour(s))   Extended cardiac holter monitor (48 hrs - 15 days)    Collection Time: 03/06/23  4:13 PM   Result Value Ref Range    Body Surface Area 1.86 m2     No results found for: CHOL, CHOLPOCT, CHOLX, CHLST, CHOLV, HDL, HDLPOC, HDLC, LDL, LDLC, VLDLC, VLDL, TGLX, TRIGL    Results for orders placed or performed in visit on 03/06/23   Extended cardiac holter monitor (48 hrs - 15 days)   Result Value Ref Range    Body Surface Area 1.86 m2       ASSESSMENT and Ralph Jasmine was seen today for bradycardia, hypertension and hyperlipidemia. Diagnoses and all orders for this visit:    Essential hypertension  -     EKG 12 Lead  -     Transthoracic echocardiogram (TTE) complete with contrast, bubble, strain, and 3D PRN; Future    Dizziness  -     EKG 12 Lead    Bradycardia  -     Transthoracic echocardiogram (TTE) complete with contrast, bubble, strain, and 3D PRN; Future  -     Extended cardiac holter monitor (48 hrs - 15 days); Future      Overall Impression    Dizziness-episode appears likely vagal mediated per history. Doubt cardiac etiology but will evaluate further. Discussed avoidance/countermeasures. Bradycardia-Per watch. Improved off verapamil which was stopped few weeks ago in [facility]. Resting heart rate around 60 currently. We will set up with Holter. Discussed less likely symptomatic bradycardia based on history. Chest pain-no recurrence. Prior negative Cardiolite. Also get some prior GERD-like symptoms. Hold off any further workup at this time. Stable sx     Dyspnea on exertion-see above. Stable for the last year; states actually improved since last visit.   Prior lobectomy/probable ILD which is contributing. No further workup. Status post lobectomy; no chemotherapy/radiation. Increase activity levels and consideration for further testing based on symptoms if progressive     Hypertension-not controlled. Target less than 130/80. Appears recently, hydrochlorothiazide and verapamil have been held. Defer any changes at this time with recent dizziness. Closely monitor home logs and maintain for follow-up. Labs reviewed from 1/10/2023 with mildly elevated creatinine around 1.2 but otherwise unremarkable. Return in about 4 weeks (around 4/3/2023).      Anaya Pillai MD  3/6/2023  4:16 PM

## 2023-03-06 NOTE — TELEPHONE ENCOUNTER
Yesterday afternoon when playing mini-golf, felt very dizzy and flushed, like she was going to pass out, and almost fell. Felt better after resting on ground for about 5-10 minutes. HR-60s at time of episode. Does not remember episode. No slurred speech, one sided weakness, confusion, or vision changes. Frequent dizziness and faint feeling when working around house for several weeks. Smart watch shows that HR frequently drops to 40s. Last seen by Dr. Lei Murillo on 8/7/19. Requests appointment with Dr. Lei Murillo, ASAP. Scheduled  next available appointment with Dr. Lei Murillo today at 3:30 pm. Daughter, Antonette Deutsch, verbalized understanding.

## 2023-04-25 ENCOUNTER — OFFICE VISIT (OUTPATIENT)
Dept: CARDIOLOGY CLINIC | Age: 78
End: 2023-04-25
Payer: COMMERCIAL

## 2023-04-25 VITALS
HEART RATE: 58 BPM | HEIGHT: 64 IN | WEIGHT: 169.2 LBS | DIASTOLIC BLOOD PRESSURE: 80 MMHG | SYSTOLIC BLOOD PRESSURE: 136 MMHG | BODY MASS INDEX: 28.89 KG/M2

## 2023-04-25 DIAGNOSIS — R42 DIZZINESS: ICD-10-CM

## 2023-04-25 DIAGNOSIS — I10 ESSENTIAL HYPERTENSION: Primary | ICD-10-CM

## 2023-04-25 DIAGNOSIS — R00.1 BRADYCARDIA: ICD-10-CM

## 2023-04-25 PROCEDURE — 3079F DIAST BP 80-89 MM HG: CPT | Performed by: INTERNAL MEDICINE

## 2023-04-25 PROCEDURE — 3075F SYST BP GE 130 - 139MM HG: CPT | Performed by: INTERNAL MEDICINE

## 2023-04-25 PROCEDURE — 99214 OFFICE O/P EST MOD 30 MIN: CPT | Performed by: INTERNAL MEDICINE

## 2023-04-25 PROCEDURE — 1123F ACP DISCUSS/DSCN MKR DOCD: CPT | Performed by: INTERNAL MEDICINE

## 2023-04-25 ASSESSMENT — ENCOUNTER SYMPTOMS
NAUSEA: 0
ORTHOPNEA: 0
VOMITING: 0
BLURRED VISION: 0
SHORTNESS OF BREATH: 0
BACK PAIN: 0
DOUBLE VISION: 0
BLOATING: 0
HEMOPTYSIS: 0
ABDOMINAL PAIN: 0
COUGH: 0

## 2023-04-25 NOTE — PROGRESS NOTES
lobectomy; no chemotherapy/radiation. Increase activity levels and consideration for further testing based on symptoms if progressive     Hypertension-controlled. Target less than 130/80. Appears recently, hydrochlorothiazide and verapamil have been held. Defer any changes at this time with recent dizziness. Continue Norvasc 5 mg daily/losartan 100 mg daily. Some intermittent dependent edema  Labs reviewed from 1/10/2023 with mildly elevated creatinine around 1.2 but otherwise unremarkable. Return in about 1 year (around 4/25/2024).      Chalmer Hatchet, MD  4/25/2023  3:36 PM

## 2024-01-26 ENCOUNTER — HOSPITAL ENCOUNTER (OUTPATIENT)
Dept: GENERAL RADIOLOGY | Age: 79
End: 2024-01-26
Payer: MEDICARE

## 2024-01-26 ENCOUNTER — OFFICE VISIT (OUTPATIENT)
Dept: PULMONOLOGY | Age: 79
End: 2024-01-26
Payer: COMMERCIAL

## 2024-01-26 VITALS
RESPIRATION RATE: 20 BRPM | BODY MASS INDEX: 25.55 KG/M2 | HEART RATE: 73 BPM | SYSTOLIC BLOOD PRESSURE: 150 MMHG | DIASTOLIC BLOOD PRESSURE: 80 MMHG | WEIGHT: 159 LBS | OXYGEN SATURATION: 97 % | HEIGHT: 66 IN | TEMPERATURE: 98 F

## 2024-01-26 DIAGNOSIS — G47.34 NOCTURNAL HYPOXIA: ICD-10-CM

## 2024-01-26 DIAGNOSIS — R13.10 DYSPHAGIA, UNSPECIFIED TYPE: ICD-10-CM

## 2024-01-26 DIAGNOSIS — Z90.2 ACQUIRED ABSENCE OF LUNG (PART OF): ICD-10-CM

## 2024-01-26 DIAGNOSIS — Z85.118 PERSONAL HISTORY OF OTHER MALIGNANT NEOPLASM OF BRONCHUS AND LUNG: ICD-10-CM

## 2024-01-26 DIAGNOSIS — J84.9 INTERSTITIAL PULMONARY DISEASE, UNSPECIFIED (HCC): Primary | ICD-10-CM

## 2024-01-26 DIAGNOSIS — Z87.891 PERSONAL HISTORY OF NICOTINE DEPENDENCE: ICD-10-CM

## 2024-01-26 DIAGNOSIS — J84.9 INTERSTITIAL PULMONARY DISEASE, UNSPECIFIED (HCC): ICD-10-CM

## 2024-01-26 DIAGNOSIS — K21.9 GASTROESOPHAGEAL REFLUX DISEASE WITHOUT ESOPHAGITIS: ICD-10-CM

## 2024-01-26 PROCEDURE — 3079F DIAST BP 80-89 MM HG: CPT | Performed by: INTERNAL MEDICINE

## 2024-01-26 PROCEDURE — 99214 OFFICE O/P EST MOD 30 MIN: CPT | Performed by: INTERNAL MEDICINE

## 2024-01-26 PROCEDURE — 71046 X-RAY EXAM CHEST 2 VIEWS: CPT

## 2024-01-26 PROCEDURE — 3077F SYST BP >= 140 MM HG: CPT | Performed by: INTERNAL MEDICINE

## 2024-01-26 PROCEDURE — 1123F ACP DISCUSS/DSCN MKR DOCD: CPT | Performed by: INTERNAL MEDICINE

## 2024-01-26 NOTE — PROGRESS NOTES
packs/day for 5.0 years (2.5 ttl pk-yrs)        Types: Cigarettes        Start date: 1985        Quit date: 1990        Years since quittin.0        Passive exposure: Past      Smokeless tobacco: Never      Allergies   Allergen Reactions    Lisinopril Other (See Comments)     Other reaction(s): cough, Cough-Intolerance     Current Outpatient Medications   Medication Instructions    amLODIPine (NORVASC) 5 mg, Oral, DAILY    BIOTIN PO Oral    CALCIUM PO Oral    celecoxib (CELEBREX) 200 mg, Oral, 2 TIMES DAILY    Cholecalciferol (VITAMIN D3 PO) Oral    Coenzyme Q10 10 MG CAPS Oral, DAILY    DULoxetine (CYMBALTA) 60 MG extended release capsule One po daily    levothyroxine (SYNTHROID) 50 MCG tablet TAKE 1 TABLET EVERY DAY BEFORE BREAKFAST    losartan (COZAAR) 100 MG tablet TAKE ONE TABLET BY MOUTH EVERY DAY    Misc Natural Products (OSTEO BI-FLEX ADV TRIPLE ST PO) Oral    Multiple Vitamin (ONE-A-DAY 55 PLUS PO) Oral    omeprazole (PRILOSEC) 40 mg, Oral, DAILY    oxyBUTYnin (DITROPAN-XL) 10 mg, DAILY    solifenacin (VESICARE) 10 MG tablet No dose, route, or frequency recorded.

## 2024-02-08 ENCOUNTER — OFFICE VISIT (OUTPATIENT)
Age: 79
End: 2024-02-08
Payer: MEDICARE

## 2024-02-08 ENCOUNTER — PREP FOR PROCEDURE (OUTPATIENT)
Dept: GASTROENTEROLOGY | Age: 79
End: 2024-02-08

## 2024-02-08 VITALS
RESPIRATION RATE: 16 BRPM | OXYGEN SATURATION: 63 % | HEIGHT: 64 IN | HEART RATE: 96 BPM | SYSTOLIC BLOOD PRESSURE: 126 MMHG | DIASTOLIC BLOOD PRESSURE: 70 MMHG | BODY MASS INDEX: 28.48 KG/M2 | WEIGHT: 166.8 LBS

## 2024-02-08 DIAGNOSIS — K21.9 GASTROESOPHAGEAL REFLUX DISEASE, UNSPECIFIED WHETHER ESOPHAGITIS PRESENT: Primary | ICD-10-CM

## 2024-02-08 DIAGNOSIS — R13.19 ESOPHAGEAL DYSPHAGIA: ICD-10-CM

## 2024-02-08 PROCEDURE — 1090F PRES/ABSN URINE INCON ASSESS: CPT | Performed by: PHYSICIAN ASSISTANT

## 2024-02-08 PROCEDURE — 3078F DIAST BP <80 MM HG: CPT | Performed by: PHYSICIAN ASSISTANT

## 2024-02-08 PROCEDURE — 99204 OFFICE O/P NEW MOD 45 MIN: CPT | Performed by: PHYSICIAN ASSISTANT

## 2024-02-08 PROCEDURE — 3074F SYST BP LT 130 MM HG: CPT | Performed by: PHYSICIAN ASSISTANT

## 2024-02-08 PROCEDURE — G8399 PT W/DXA RESULTS DOCUMENT: HCPCS | Performed by: PHYSICIAN ASSISTANT

## 2024-02-08 PROCEDURE — 1123F ACP DISCUSS/DSCN MKR DOCD: CPT | Performed by: PHYSICIAN ASSISTANT

## 2024-02-08 PROCEDURE — G8484 FLU IMMUNIZE NO ADMIN: HCPCS | Performed by: PHYSICIAN ASSISTANT

## 2024-02-08 PROCEDURE — G8417 CALC BMI ABV UP PARAM F/U: HCPCS | Performed by: PHYSICIAN ASSISTANT

## 2024-02-08 PROCEDURE — G8427 DOCREV CUR MEDS BY ELIG CLIN: HCPCS | Performed by: PHYSICIAN ASSISTANT

## 2024-02-08 PROCEDURE — 1036F TOBACCO NON-USER: CPT | Performed by: PHYSICIAN ASSISTANT

## 2024-02-08 RX ORDER — FAMOTIDINE 20 MG/1
20 TABLET, FILM COATED ORAL DAILY
Qty: 90 TABLET | Refills: 0 | Status: SHIPPED | OUTPATIENT
Start: 2024-02-08

## 2024-02-08 NOTE — PATIENT INSTRUCTIONS
For reflux:   Eat smaller and more frequent meals. Avoid lying down for 3 hours after eating. Elevate the head of the bed by 6 inches. Avoid wearing tight-fitting clothing. Stop smoking and avoid alcohol. Avoid NSAID medications (Aspirin, Excedrin, Aleve, Ibuprofen, Goody Powder, Toradol, Mobic, Voltaren, etc). Consider weight loss to get to a healthy weight, which is often critical to eliminating symptoms of reflux. Avoid foods and acid-containing beverages that can exacerbate the symptoms of reflux. This includes:     -Caffeine  -Chocolate  -Peppermint  -Alcohol (especially red wine)  -Carbonated beverages  -Citrus fruits  -Tomato-based products  -Vinegar  -Spicy and greasy foods

## 2024-02-08 NOTE — PROGRESS NOTES
Allergies   Allergen Reactions    Lisinopril Other (See Comments)     Other reaction(s): cough, Cough-Intolerance        Family History   Problem Relation Age of Onset    Breast Cancer Neg Hx     Coronary Art Dis Father         MI at 65    Cancer Father         lung    Heart Disease Father     Hypertension Sister     Cancer Mother     Osteoarthritis Other        Current Outpatient Medications   Medication Sig Dispense Refill    famotidine (PEPCID) 20 MG tablet Take 1 tablet by mouth daily 90 tablet 0    Misc Natural Products (OSTEO BI-FLEX ADV TRIPLE ST PO) Take by mouth      solifenacin (VESICARE) 10 MG tablet       Cholecalciferol (VITAMIN D3 PO) Take by mouth      Multiple Vitamin (ONE-A-DAY 55 PLUS PO) Take by mouth      CALCIUM PO Take by mouth      BIOTIN PO Take by mouth      amLODIPine (NORVASC) 5 MG tablet Take 1 tablet by mouth daily      celecoxib (CELEBREX) 200 MG capsule Take 1 capsule by mouth 2 times daily      Coenzyme Q10 10 MG CAPS Take by mouth daily      DULoxetine (CYMBALTA) 60 MG extended release capsule One po daily      levothyroxine (SYNTHROID) 50 MCG tablet TAKE 1 TABLET EVERY DAY BEFORE BREAKFAST      losartan (COZAAR) 100 MG tablet TAKE ONE TABLET BY MOUTH EVERY DAY      oxybutynin (DITROPAN-XL) 10 MG extended release tablet Take 10 mg by mouth daily (Patient not taking: Reported on 3/6/2023)       No current facility-administered medications for this visit.       Review of Systems  All other systems reviewed and are negative except as noted above.          Objective     Vitals:    02/08/24 1432   BP: 126/70   Pulse: 96   Resp: 16   SpO2: (!) 63%       Physical Exam  Vitals reviewed.   Constitutional:       General: She is not in acute distress.     Appearance: She is not ill-appearing, toxic-appearing or diaphoretic.   Eyes:      General: No scleral icterus.  Cardiovascular:      Rate and Rhythm: Normal rate and regular rhythm.   Pulmonary:      Effort: Pulmonary effort is normal.

## 2024-02-09 RX ORDER — SODIUM CHLORIDE 0.9 % (FLUSH) 0.9 %
5-40 SYRINGE (ML) INJECTION EVERY 12 HOURS SCHEDULED
Status: CANCELLED | OUTPATIENT
Start: 2024-02-09

## 2024-02-09 RX ORDER — SODIUM CHLORIDE 9 MG/ML
25 INJECTION, SOLUTION INTRAVENOUS PRN
Status: CANCELLED | OUTPATIENT
Start: 2024-02-09

## 2024-02-09 RX ORDER — SODIUM CHLORIDE 0.9 % (FLUSH) 0.9 %
5-40 SYRINGE (ML) INJECTION PRN
Status: CANCELLED | OUTPATIENT
Start: 2024-02-09

## 2024-03-07 ENCOUNTER — TELEPHONE (OUTPATIENT)
Dept: GASTROENTEROLOGY | Age: 79
End: 2024-03-07

## 2024-03-07 NOTE — TELEPHONE ENCOUNTER
Called patient informing her of GI Doctor change.  She requested case to stay at  and Dr. Estrella will do her case on 4/11 at 10:30 am.

## 2024-03-18 ENCOUNTER — HOSPITAL ENCOUNTER (OUTPATIENT)
Dept: PULMONOLOGY | Age: 79
Discharge: HOME | End: 2024-03-21
Payer: MEDICARE

## 2024-03-18 PROCEDURE — 94729 DIFFUSING CAPACITY: CPT

## 2024-03-18 PROCEDURE — 94060 EVALUATION OF WHEEZING: CPT

## 2024-03-18 PROCEDURE — 94726 PLETHYSMOGRAPHY LUNG VOLUMES: CPT

## 2024-04-05 NOTE — PERIOP NOTE
Patient verified name, , and procedure.    Type: 1a; abbreviated assessment per anesthesia guidelines  Labs per surgeon: None  Labs per anesthesia: None      Instructed pt that they will be notified by the Gi Lab for time of arrival. If any questions please call the GI lab at [phone].    Follow diet and prep instructions per office. May have clear liquids until 4 hours prior to time of arrival.    Bath or shower the night before and the am of surgery with antibacterial soap. No lotions, oils, powders, cologne on skin. No make up, eye make up or jewelry. Wear loose fitting comfortable, clean clothing.     Must have adult present in building the entire time .     Medications for the day of procedure Amlodipine, Levothyroxine, Pepcid    The following discharge instructions reviewed with patient: medication given during procedure may cause drowsiness for several hours, therefore, do not drive or operate machinery for remainder of the day, no alcohol on the day of your procedure, resume regular diet and activity unless otherwise directed, for mild sore throat you may use Cepacol throat lozenges or warm salt water gargles as needed, call your physician for any problems or questions. Patient verbalizes understanding.

## 2024-04-10 ENCOUNTER — ANESTHESIA EVENT (OUTPATIENT)
Dept: ENDOSCOPY | Age: 79
End: 2024-04-10
Payer: MEDICARE

## 2024-04-10 RX ORDER — HYDROMORPHONE HYDROCHLORIDE 1 MG/ML
0.5 INJECTION, SOLUTION INTRAMUSCULAR; INTRAVENOUS; SUBCUTANEOUS EVERY 5 MIN PRN
Status: CANCELLED | OUTPATIENT
Start: 2024-04-10

## 2024-04-10 RX ORDER — OXYCODONE HYDROCHLORIDE 5 MG/1
5 TABLET ORAL
Status: CANCELLED | OUTPATIENT
Start: 2024-04-10 | End: 2024-04-11

## 2024-04-10 RX ORDER — NALOXONE HYDROCHLORIDE 0.4 MG/ML
INJECTION, SOLUTION INTRAMUSCULAR; INTRAVENOUS; SUBCUTANEOUS PRN
Status: CANCELLED | OUTPATIENT
Start: 2024-04-10

## 2024-04-10 RX ORDER — ONDANSETRON 2 MG/ML
4 INJECTION INTRAMUSCULAR; INTRAVENOUS
Status: CANCELLED | OUTPATIENT
Start: 2024-04-10 | End: 2024-04-11

## 2024-04-10 RX ORDER — DIPHENHYDRAMINE HYDROCHLORIDE 50 MG/ML
12.5 INJECTION INTRAMUSCULAR; INTRAVENOUS
Status: CANCELLED | OUTPATIENT
Start: 2024-04-10 | End: 2024-04-11

## 2024-04-11 ENCOUNTER — ANESTHESIA (OUTPATIENT)
Dept: ENDOSCOPY | Age: 79
End: 2024-04-11
Payer: MEDICARE

## 2024-04-11 ENCOUNTER — HOSPITAL ENCOUNTER (OUTPATIENT)
Age: 79
Setting detail: OUTPATIENT SURGERY
Discharge: HOME | End: 2024-04-11
Attending: INTERNAL MEDICINE | Admitting: INTERNAL MEDICINE
Payer: MEDICARE

## 2024-04-11 VITALS
RESPIRATION RATE: 20 BRPM | SYSTOLIC BLOOD PRESSURE: 154 MMHG | HEART RATE: 56 BPM | HEIGHT: 64 IN | TEMPERATURE: 97.5 F | OXYGEN SATURATION: 95 % | WEIGHT: 167.5 LBS | BODY MASS INDEX: 28.6 KG/M2 | DIASTOLIC BLOOD PRESSURE: 77 MMHG

## 2024-04-11 PROCEDURE — 3700000001 HC ADD 15 MINUTES (ANESTHESIA): Performed by: INTERNAL MEDICINE

## 2024-04-11 PROCEDURE — 7100000010 HC PHASE II RECOVERY - FIRST 15 MIN: Performed by: INTERNAL MEDICINE

## 2024-04-11 PROCEDURE — 6360000002 HC RX W HCPCS: Performed by: NURSE ANESTHETIST, CERTIFIED REGISTERED

## 2024-04-11 PROCEDURE — 2720000010 HC SURG SUPPLY STERILE: Performed by: INTERNAL MEDICINE

## 2024-04-11 PROCEDURE — 88305 TISSUE EXAM BY PATHOLOGIST: CPT

## 2024-04-11 PROCEDURE — 88312 SPECIAL STAINS GROUP 1: CPT

## 2024-04-11 PROCEDURE — 7100000011 HC PHASE II RECOVERY - ADDTL 15 MIN: Performed by: INTERNAL MEDICINE

## 2024-04-11 PROCEDURE — 3700000000 HC ANESTHESIA ATTENDED CARE: Performed by: INTERNAL MEDICINE

## 2024-04-11 PROCEDURE — 2709999900 HC NON-CHARGEABLE SUPPLY: Performed by: INTERNAL MEDICINE

## 2024-04-11 PROCEDURE — 2580000003 HC RX 258: Performed by: ANESTHESIOLOGY

## 2024-04-11 PROCEDURE — 2500000003 HC RX 250 WO HCPCS: Performed by: NURSE ANESTHETIST, CERTIFIED REGISTERED

## 2024-04-11 PROCEDURE — 3609012400 HC EGD TRANSORAL BIOPSY SINGLE/MULTIPLE: Performed by: INTERNAL MEDICINE

## 2024-04-11 PROCEDURE — 43239 EGD BIOPSY SINGLE/MULTIPLE: CPT | Performed by: INTERNAL MEDICINE

## 2024-04-11 PROCEDURE — 43249 ESOPH EGD DILATION <30 MM: CPT | Performed by: INTERNAL MEDICINE

## 2024-04-11 RX ORDER — PROPOFOL 10 MG/ML
INJECTION, EMULSION INTRAVENOUS PRN
Status: DISCONTINUED | OUTPATIENT
Start: 2024-04-11 | End: 2024-04-11 | Stop reason: SDUPTHER

## 2024-04-11 RX ORDER — SODIUM CHLORIDE 0.9 % (FLUSH) 0.9 %
5-40 SYRINGE (ML) INJECTION EVERY 12 HOURS SCHEDULED
Status: DISCONTINUED | OUTPATIENT
Start: 2024-04-11 | End: 2024-04-11 | Stop reason: HOSPADM

## 2024-04-11 RX ORDER — SODIUM CHLORIDE, SODIUM LACTATE, POTASSIUM CHLORIDE, CALCIUM CHLORIDE 600; 310; 30; 20 MG/100ML; MG/100ML; MG/100ML; MG/100ML
INJECTION, SOLUTION INTRAVENOUS CONTINUOUS
Status: DISCONTINUED | OUTPATIENT
Start: 2024-04-11 | End: 2024-04-11 | Stop reason: HOSPADM

## 2024-04-11 RX ORDER — SODIUM CHLORIDE 0.9 % (FLUSH) 0.9 %
5-40 SYRINGE (ML) INJECTION PRN
Status: DISCONTINUED | OUTPATIENT
Start: 2024-04-11 | End: 2024-04-11 | Stop reason: HOSPADM

## 2024-04-11 RX ORDER — LIDOCAINE HYDROCHLORIDE 20 MG/ML
INJECTION, SOLUTION EPIDURAL; INFILTRATION; INTRACAUDAL; PERINEURAL PRN
Status: DISCONTINUED | OUTPATIENT
Start: 2024-04-11 | End: 2024-04-11 | Stop reason: SDUPTHER

## 2024-04-11 RX ORDER — SODIUM CHLORIDE 9 MG/ML
INJECTION, SOLUTION INTRAVENOUS PRN
Status: DISCONTINUED | OUTPATIENT
Start: 2024-04-11 | End: 2024-04-11 | Stop reason: HOSPADM

## 2024-04-11 RX ORDER — FENTANYL CITRATE 50 UG/ML
100 INJECTION, SOLUTION INTRAMUSCULAR; INTRAVENOUS
Status: DISCONTINUED | OUTPATIENT
Start: 2024-04-11 | End: 2024-04-11 | Stop reason: HOSPADM

## 2024-04-11 RX ORDER — FENTANYL CITRATE 50 UG/ML
25 INJECTION, SOLUTION INTRAMUSCULAR; INTRAVENOUS
Status: DISCONTINUED | OUTPATIENT
Start: 2024-04-11 | End: 2024-04-11 | Stop reason: HOSPADM

## 2024-04-11 RX ORDER — MIDAZOLAM HYDROCHLORIDE 2 MG/2ML
2 INJECTION, SOLUTION INTRAMUSCULAR; INTRAVENOUS
Status: DISCONTINUED | OUTPATIENT
Start: 2024-04-11 | End: 2024-04-11 | Stop reason: HOSPADM

## 2024-04-11 RX ORDER — SODIUM CHLORIDE 9 MG/ML
25 INJECTION, SOLUTION INTRAVENOUS PRN
Status: DISCONTINUED | OUTPATIENT
Start: 2024-04-11 | End: 2024-04-11 | Stop reason: HOSPADM

## 2024-04-11 RX ADMIN — PROPOFOL 120 MCG/KG/MIN: 10 INJECTION, EMULSION INTRAVENOUS at 09:16

## 2024-04-11 RX ADMIN — LIDOCAINE HYDROCHLORIDE 100 MG: 20 INJECTION, SOLUTION EPIDURAL; INFILTRATION; INTRACAUDAL; PERINEURAL at 09:15

## 2024-04-11 RX ADMIN — SODIUM CHLORIDE, POTASSIUM CHLORIDE, SODIUM LACTATE AND CALCIUM CHLORIDE: 600; 310; 30; 20 INJECTION, SOLUTION INTRAVENOUS at 07:46

## 2024-04-11 RX ADMIN — PROPOFOL 50 MG: 10 INJECTION, EMULSION INTRAVENOUS at 09:15

## 2024-04-11 NOTE — PROCEDURES
Endoscopy Note          Operative Report    Patient: Poppy Morgan MRN: [id number]      YOB: 1945  Age: 78 y.o.  Sex: female            Indications:  Reflux, dysphagia     Preoperative Evaluation: The patient was evaluated prior to the procedure in the GI lab admission area, the patient ASA was recorded .  Consent was obtained from the patient with the risk of perforation bleeding and aspiration.    Anesthesia: AZALEA-per anesthesia  Complications: None  EBL: Unremarkable  Procedure: The patient was sedated in the left lateral decubitus position. Scope was advance from the mouth to the third portion of the duodenum. The scope was withdrawn to the stomach and a refroflexed view was performed.     Findings:   3-4 cm hiatal hernia   LA grade A esophagitis- biopsies taken of distal esophagus   Benign stricture at GE junction- dilated 20 mm   Gastritis- biopsies of the stomach were taken   Normal duodenum     Postoperative Diagnosis:  3-4 cm hiatal hernia   LA grade A esophagitis- biopsies taken of distal esophagus   Benign stricture at GE junction- dilated 20 mm   Gastritis- biopsies of the stomach were taken   Normal duodenum       Recommendations:   Follow up path results in 1-2 weeks   Return to clinic as scheduled   Chew well, small bites with meals, liquids with all meds and pills, sit up with meals    Prilosec 20mg PO over the counter, twice a day, 30 minutes prior to meals  Avoid NSAIDs    Repeat exam as needed     Signed By:  Troy Estrella MD     April 11, 2024

## 2024-04-11 NOTE — ANESTHESIA POSTPROCEDURE EVALUATION
Department of Anesthesiology  Postprocedure Note    Patient: Poppy Morgan  MRN: [id number]  YOB: 1945  Date of evaluation: 4/11/2024    Procedure Summary       Date: 04/11/24 Room / Location: [facility] ENDO 01 / [facility] ENDOSCOPY    Anesthesia Start: 0910 Anesthesia Stop: 0938    Procedure: ESOPHAGOGASTRODUODENOSCOPY BIOPSY/Balloon dilation Diagnosis:       Gastroesophageal reflux disease, unspecified whether esophagitis present      Esophageal dysphagia      (Gastroesophageal reflux disease, unspecified whether esophagitis present [K21.9])      (Esophageal dysphagia [R13.19])    Surgeons: Troy Estrella MD Responsible Provider: Harshal Samson MD    Anesthesia Type: TIVA ASA Status: 4            Anesthesia Type: No value filed.    Amberly Phase I:      Amberly Phase II: Amberly Score: 10    Anesthesia Post Evaluation    Patient location during evaluation: PACU  Patient participation: complete - patient participated  Level of consciousness: awake and alert  Airway patency: patent  Nausea & Vomiting: no nausea and no vomiting  Cardiovascular status: hemodynamically stable  Respiratory status: acceptable, nonlabored ventilation and spontaneous ventilation  Hydration status: euvolemic  Comments: BP (!) 154/77   Pulse 56   Temp 98 °F (36.7 °C)   Resp 20   Ht 1.626 m (5' 4.02\")   Wt 76 kg (167 lb 8 oz)   SpO2 95%   BMI 28.74 kg/m²     Multimodal analgesia pain management approach  Pain management: adequate and satisfactory to patient    No notable events documented.

## 2024-04-11 NOTE — DISCHARGE INSTRUCTIONS
MEDICATION INTERACTION:    During your procedure you potentially received a medication or medications which may reduce the effectiveness of oral contraceptives. Please consider other forms of contraception for 1 month following your procedure if you are currently using oral contraceptives as your primary form of birth control. In addition to this, we recommend continuing your oral contraceptive as prescribed, unless otherwise instructed by your physician, during this time.    After general anesthesia or intravenous sedation, for 24 hours or while taking prescription Narcotics:  Limit your activities  A responsible adult needs to be with you for the next 24 hours  Do not drive and operate hazardous machinery  Do not make important personal or business decisions  Do not drink alcoholic beverages  If you have not urinated within 8 hours after discharge, and you are experiencing discomfort from urinary retention, please go to the nearest ED.  If you have sleep apnea and have a CPAP machine, please use it for all naps and sleeping.  Please use caution when taking narcotics and any of your home medications that may cause drowsiness.  *  Please give a list of your current medications to your Primary Care Provider.  *  Please update this list whenever your medications are discontinued, doses are      changed, or new medications (including over-the-counter products) are added.  *  Please carry medication information at all times in case of emergency situations.    These are general instructions for a healthy lifestyle:  No smoking/ No tobacco products/ Avoid exposure to second hand smoke  Surgeon General's Warning:  Quitting smoking now greatly reduces serious risk to your health.  Obesity, smoking, and sedentary lifestyle greatly increases your risk for illness  A healthy diet, regular physical exercise & weight monitoring are important for maintaining a healthy lifestyle    You may be retaining fluid if you have a history

## 2024-04-11 NOTE — ANESTHESIA PRE PROCEDURE
Department of Anesthesiology  Preprocedure Note       Name:  Poppy Morgan   Age:  78 y.o.  :  1945                                          MRN:  [id number]         Date:  2024      Surgeon: Surgeon(s):  Troy Estrella MD    Procedure: Procedure(s):  ESOPHAGOGASTRODUODENOSCOPY DILATATION    Medications prior to admission:   Prior to Admission medications    Medication Sig Start Date End Date Taking? Authorizing Provider   OXYGEN by Other route at bedtime 3L via NC at night   Yes Provider, MD Akash   famotidine (PEPCID) 20 MG tablet Take 1 tablet by mouth daily 24   Grinnell, Melissa R, PA-C   Misc Natural Products (OSTEO BI-FLEX ADV TRIPLE ST PO) Take by mouth    ProviderAkash MD   Cholecalciferol (VITAMIN D3 PO) Take by mouth    Provider, MD Akash   Multiple Vitamin (ONE-A-DAY 55 PLUS PO) Take by mouth    ProviderAkash MD   CALCIUM PO Take by mouth    Provider, MD Akash   BIOTIN PO Take by mouth    Automatic Reconciliation, Ar   amLODIPine (NORVASC) 5 MG tablet Take 1 tablet by mouth daily    Automatic Reconciliation, Ar   celecoxib (CELEBREX) 200 MG capsule Take 1 capsule by mouth daily 18   Automatic Reconciliation, Ar   Coenzyme Q10 10 MG CAPS Take by mouth daily    Automatic Reconciliation, Ar   DULoxetine (CYMBALTA) 60 MG extended release capsule Take 1 capsule by mouth at bedtime One po daily 18   Automatic Reconciliation, Ar   levothyroxine (SYNTHROID) 50 MCG tablet TAKE 1 TABLET EVERY DAY BEFORE BREAKFAST 18   Automatic Reconciliation, Ar   losartan (COZAAR) 100 MG tablet TAKE ONE TABLET BY MOUTH EVERY DAY 18   Automatic Reconciliation, Ar       Current medications:    Current Facility-Administered Medications   Medication Dose Route Frequency Provider Last Rate Last Admin   • fentaNYL (SUBLIMAZE) injection 25 mcg  25 mcg IntraVENous Once PRN Harshal Samson MD        Or   • fentaNYL (SUBLIMAZE) injection 100 mcg  100 mcg

## 2024-04-11 NOTE — H&P
HISTORY AND PHYSICAL             Date: 4/11/2024        Patient Name: Poppy Morgan     YOB: 1945      Age:  78 y.o.      History of Present Illness   GERD, dysphagia     Past Medical History     Past Medical History:   Diagnosis Date    Basal cell carcinoma 2001    Diabetes (HCC)     patient denies, states prediabetic    Esophageal dysphagia     Followed by GI, EGD with dilation scheduled    Esophageal reflux     Fibromyalgia     Gastroesophageal reflux disease without esophagitis 8/10/2016    Generalized osteoarthrosis, involving multiple sites     GERD (gastroesophageal reflux disease)     managed with medication    History of syncope     loop recorder in place, pt followed by [facility]    Hypertension     managed with medication    Implantable loop recorder present 12/2023    Interstitial pulmonary disease (HCC)     3L oxygen via NC QHS, followed by pulmonary    Lung cancer ([facility]) 06/2017    carcinoma right upper lung--s/p RULobectomy Sept 2017    Menopause     Obstructive chronic bronchitis without exacerbation (HCC)     no inhalers--3L of oxygen via NC at night, followed by Pulmonary    Other abnormal glucose     HgbA1C 5.9 in 5/13    Other and unspecified special symptom or syndrome, not elsewhere classified 2001    intestinal blockage    Pain in joint, multiple sites     Prediabetes     HgbA1C 5.9 in 5/13    Pure hypercholesterolemia     Unspecified hypothyroidism 5/13    managed with medication    Urinary frequency         Past Surgical History     Past Surgical History:   Procedure Laterality Date    ANTERIOR CRUCIATE LIGAMENT REPAIR      ACL repair right knee    BREAST BIOPSY Left     BREAST SURGERY  8/04    left breast cyst aspiration/stereotactic core biopsy, fibroadenoma (benign)    BUNIONECTOMY      bilateral    CATARACT REMOVAL      bilateral with IOL implants    COLONOSCOPY  8/08    CYST REMOVAL      ganglion    INSERTABLE CARDIAC MONITOR      loop recorder     Systems   - CONSTITUTIONAL: Denies weight loss, fever and chills.    - HEENT: Denies changes in vision and hearing.    - RESPIRATORY: Denies SOB and cough.    - CV: Denies palpitations and CP.    - GI: Denies abdominal pain, nausea.    - : Denies dysuria and urinary frequency.    - MSK: Denies myalgia and joint pain.    - SKIN: Denies rash and pruritus.    - NEUROLOGICAL: Denies headache and syncope.    - PSYCHIATRIC: Denies recent changes in mood. Denies anxiety and depression.    - SKIN: No jaundice or skin lesions.    -RECTAL: No pain or tenderness.     Physical Exam   BP (!) 164/73   Pulse 65   Temp 97.2 °F (36.2 °C) (Temporal)   Resp 16   Ht 1.626 m (5' 4.02\")   Wt 76 kg (167 lb 8 oz)   SpO2 95%   BMI 28.74 kg/m²     - GENERAL: Alert and oriented x 3. No acute distress. Well-nourished.    - EYES: EOMI. Anicteric.    - HENT: Moist mucous membranes. No cervical lymphadenopathy.    - LUNGS: Clear to auscultation bilaterally. No accessory muscle use.    - CARDIOVASCULAR: Regular rate and rhythm. No murmur. No JVD.    - ABDOMEN: Soft, non-tender and non-distended. No palpable masses.    - EXTREMITIES: No edema. Non-tender.?SKIN: No rashes or lesions. Warm.    - NEUROLOGIC: No focal neurological deficits. CN II-XII grossly intact, but not individually tested.    - PSYCHIATRIC: Cooperative. Appropriate mood and affect.    - SKIN: No rashes, sores, or lesions.     - RECTAL: Deferred.     Labs      No results found for this or any previous visit (from the past 24 hour(s)).     Imaging/Diagnostics Last 24 Hours     See my note above, if applicable.       Assessment & Plan:   GERD, dysphagia   Plan for EGD with biopsies and possible dilation +

## 2024-04-17 ENCOUNTER — TELEPHONE (OUTPATIENT)
Age: 79
End: 2024-04-17

## 2024-04-17 NOTE — TELEPHONE ENCOUNTER
Called pt with results of EGD/biopsies per Dr. Estrella.     Reviewed results and recommendations per MD with pt. Pt verbalized understanding.     Pt reports that she did not  omeprazole as she feels more comfortable continuing on Pepcid 20mg instead of PPI due to concerns for memory loss that she discussed with Melissa Grinnell, PA at  on 2/8/24. Pt reports significant improvement in reflux sx since starting Pepcid daily as prescribed by ADDIS Harris along with anti-reflux measures recommended by Kimberly. Told pt to continue with current Pepcid regimen as prescribed by PA for now and that I would relay message to Dr. Estrella for him to advise if PPI switch is necessary. Instructed pt to call back with questions and let her know I would reach back out if Dr. Estrella does recommend anything other than continuing on Pepcid.     Relayed pt message to Dr. Estrella in person. Per Dr. Estrella, as long as pt's sx of reflux remain controlled on Pepcid as prescribed by ADDIS Harris, pt does not need to start PPI at this time due to pt concerns. Per MD, if pt's sx worsen or return while on Pepcid, he does recommend adding at least once daily PPI for control of reflux as noted during EGD.     Scheduled f/u appt for 5/9/24 at 11:15am with ADDIS Harris as recommended by PA. Gave address for [facility].       ------      Per Dr. Estrella, \"Biopsies unremarkable.\"    Date Obtained:   4/11/2024   DIAGNOSIS       A:  \"GASTRIC BIOPSIES\":  MINIMAL CHRONIC INACTIVE GASTRITIS.        B:  \"DISTAL ESOPHAGUS BIOPSIES\":  DETACHED FRAGMENTS OF MINIMALLY   HYPERPLASTIC SQUAMOUS EPITHELIUM FOCALLY HAVING CHANGES OF REPAIR.       Procedure: The patient was sedated in the left lateral decubitus position. Scope was advance from the mouth to the third portion of the duodenum. The scope was withdrawn to the stomach and a refroflexed view was performed.      Findings:   3-4 cm hiatal hernia   LA grade A esophagitis- biopsies taken of distal esophagus   Benign

## 2024-04-22 ENCOUNTER — OFFICE VISIT (OUTPATIENT)
Dept: PULMONOLOGY | Age: 79
End: 2024-04-22
Payer: MEDICARE

## 2024-04-22 VITALS
DIASTOLIC BLOOD PRESSURE: 80 MMHG | OXYGEN SATURATION: 94 % | RESPIRATION RATE: 20 BRPM | WEIGHT: 168 LBS | BODY MASS INDEX: 27.99 KG/M2 | TEMPERATURE: 98 F | HEART RATE: 65 BPM | HEIGHT: 65 IN | SYSTOLIC BLOOD PRESSURE: 130 MMHG

## 2024-04-22 DIAGNOSIS — Z90.2 S/P LOBECTOMY OF LUNG: ICD-10-CM

## 2024-04-22 DIAGNOSIS — Z85.118 PERSONAL HISTORY OF OTHER MALIGNANT NEOPLASM OF BRONCHUS AND LUNG: ICD-10-CM

## 2024-04-22 DIAGNOSIS — J84.9 INTERSTITIAL PULMONARY DISEASE, UNSPECIFIED (HCC): ICD-10-CM

## 2024-04-22 DIAGNOSIS — Z87.891 PERSONAL HISTORY OF NICOTINE DEPENDENCE: ICD-10-CM

## 2024-04-22 DIAGNOSIS — J45.40 MODERATE PERSISTENT ASTHMA WITHOUT COMPLICATION: Primary | ICD-10-CM

## 2024-04-22 PROCEDURE — 1123F ACP DISCUSS/DSCN MKR DOCD: CPT | Performed by: INTERNAL MEDICINE

## 2024-04-22 PROCEDURE — 1036F TOBACCO NON-USER: CPT | Performed by: INTERNAL MEDICINE

## 2024-04-22 PROCEDURE — 3075F SYST BP GE 130 - 139MM HG: CPT | Performed by: INTERNAL MEDICINE

## 2024-04-22 PROCEDURE — G8427 DOCREV CUR MEDS BY ELIG CLIN: HCPCS | Performed by: INTERNAL MEDICINE

## 2024-04-22 PROCEDURE — 3079F DIAST BP 80-89 MM HG: CPT | Performed by: INTERNAL MEDICINE

## 2024-04-22 PROCEDURE — G8417 CALC BMI ABV UP PARAM F/U: HCPCS | Performed by: INTERNAL MEDICINE

## 2024-04-22 PROCEDURE — 1090F PRES/ABSN URINE INCON ASSESS: CPT | Performed by: INTERNAL MEDICINE

## 2024-04-22 PROCEDURE — G8399 PT W/DXA RESULTS DOCUMENT: HCPCS | Performed by: INTERNAL MEDICINE

## 2024-04-22 PROCEDURE — 99214 OFFICE O/P EST MOD 30 MIN: CPT | Performed by: INTERNAL MEDICINE

## 2024-04-22 RX ORDER — FLUTICASONE FUROATE 100 UG/1
1 POWDER RESPIRATORY (INHALATION) DAILY
Qty: 1 EACH | Refills: 11 | Status: SHIPPED | OUTPATIENT
Start: 2024-04-22

## 2024-04-22 NOTE — PROGRESS NOTES
Name:  Poppy Morgan  YOB: 1945   MRN: [id number]      Office Visit: 4/22/2024        ASSESSMENT AND PLAN:  (Medical Decision Making)    Impression: Pt with h/o lung adenocarcinoma s/p RULobectomy Sept 2017. No signs of recurrence, completed 5 years of annual CT scans. No longer qualifies for ongoing CT scans.     ILD - minimal ILD at periphery of both lungs. cPFT's have been normal to near normal. 6MW without desaturation.    Some increased SOB. Walking sat with low 90's.   Complete PFTs with mild obstruction.  Minimal smoking history, suspect this is more asthmatic and COPD in nature.  Cough is better with improvement in her GERD control.    On 2.5L O2 at night.    -Continue oxygen 2.5 L with sleep.  -Will start her on Arnuity inhaler to target asthma to see if this improves her shortness of breath.  -Will continue to get annual complete PFTs to monitor for any worsening ILD related changes.  -Otherwise does not qualify for ongoing CT scans of the chest.      Follow-up in 6 months    ASSESSMENT/PLAN:    1. Moderate persistent asthma without complication    2. Interstitial pulmonary disease, unspecified (HCC)    3. Personal history of other malignant neoplasm of bronchus and lung    4. Personal history of nicotine dependence    5. S/P lobectomy of lung            Orders Placed This Encounter   Medications    fluticasone (ARNUITY ELLIPTA) 100 MCG/ACT AEPB     Sig: Inhale 1 puff into the lungs daily     Dispense:  1 each     Refill:  11       No orders of the defined types were placed in this encounter.      Follow-up and Dispositions    Return in about 6 months (around 10/22/2024) for With Me or Mery.       Jeremy Sandoval MD    No specialty comments available.    Total time for encounter on day of encounter was 20 minutes.  This time includes chart prep, review of tests/procedures, review of other provider's notes, documentation and counseling patient regarding disease process and

## 2024-05-30 ENCOUNTER — OFFICE VISIT (OUTPATIENT)
Age: 79
End: 2024-05-30
Payer: MEDICARE

## 2024-05-30 VITALS
HEIGHT: 65 IN | SYSTOLIC BLOOD PRESSURE: 135 MMHG | HEART RATE: 60 BPM | WEIGHT: 165 LBS | BODY MASS INDEX: 27.49 KG/M2 | DIASTOLIC BLOOD PRESSURE: 75 MMHG | OXYGEN SATURATION: 98 %

## 2024-05-30 DIAGNOSIS — K21.00 GASTROESOPHAGEAL REFLUX DISEASE WITH ESOPHAGITIS WITHOUT HEMORRHAGE: Primary | ICD-10-CM

## 2024-05-30 DIAGNOSIS — K22.2 BENIGN ESOPHAGEAL STRICTURE: ICD-10-CM

## 2024-05-30 DIAGNOSIS — K44.9 HIATAL HERNIA: ICD-10-CM

## 2024-05-30 DIAGNOSIS — K29.50 MILD CHRONIC GASTRITIS: ICD-10-CM

## 2024-05-30 PROCEDURE — 3078F DIAST BP <80 MM HG: CPT | Performed by: PHYSICIAN ASSISTANT

## 2024-05-30 PROCEDURE — G8427 DOCREV CUR MEDS BY ELIG CLIN: HCPCS | Performed by: PHYSICIAN ASSISTANT

## 2024-05-30 PROCEDURE — G8417 CALC BMI ABV UP PARAM F/U: HCPCS | Performed by: PHYSICIAN ASSISTANT

## 2024-05-30 PROCEDURE — 1090F PRES/ABSN URINE INCON ASSESS: CPT | Performed by: PHYSICIAN ASSISTANT

## 2024-05-30 PROCEDURE — 99213 OFFICE O/P EST LOW 20 MIN: CPT | Performed by: PHYSICIAN ASSISTANT

## 2024-05-30 PROCEDURE — G8399 PT W/DXA RESULTS DOCUMENT: HCPCS | Performed by: PHYSICIAN ASSISTANT

## 2024-05-30 PROCEDURE — 1036F TOBACCO NON-USER: CPT | Performed by: PHYSICIAN ASSISTANT

## 2024-05-30 PROCEDURE — 3075F SYST BP GE 130 - 139MM HG: CPT | Performed by: PHYSICIAN ASSISTANT

## 2024-05-30 PROCEDURE — 1123F ACP DISCUSS/DSCN MKR DOCD: CPT | Performed by: PHYSICIAN ASSISTANT

## 2024-05-30 NOTE — PROGRESS NOTES
visit.       Review of Systems  All other systems reviewed and are negative except as noted above.          Objective     Vitals:    05/30/24 1112   BP: 135/75   Pulse: 60   SpO2: 98%       Physical Exam  Vitals reviewed.   Constitutional:       General: She is not in acute distress.     Appearance: Normal appearance. She is not ill-appearing, toxic-appearing or diaphoretic.   Eyes:      General: No scleral icterus.  Cardiovascular:      Rate and Rhythm: Normal rate.   Pulmonary:      Effort: Pulmonary effort is normal. No respiratory distress.   Skin:     General: Skin is dry.      Coloration: Skin is not jaundiced.   Neurological:      Mental Status: She is alert and oriented to person, place, and time.   Psychiatric:         Mood and Affect: Mood normal.         Behavior: Behavior normal.         Thought Content: Thought content normal.         Judgment: Judgment normal.              Return if symptoms worsen or fail to improve.            An electronic signature was used to authenticate this note.    --Melissa R Grinnell, PA-C

## 2024-06-17 ENCOUNTER — TELEPHONE (OUTPATIENT)
Dept: GASTROENTEROLOGY | Age: 79
End: 2024-06-17

## 2024-06-17 NOTE — TELEPHONE ENCOUNTER
Medication has been called in to pharmacy on file. Called pt to let her know. Pt verbalized understanding with no other questions or concerns.  
Patient needs a refill on her PEPCID 20 MG tablet.    Informed her Kimberly is out this week but will get the MA to try and get it filled.  
none

## 2024-10-23 ENCOUNTER — OFFICE VISIT (OUTPATIENT)
Dept: PULMONOLOGY | Age: 79
End: 2024-10-23
Payer: MEDICARE

## 2024-10-23 VITALS
HEIGHT: 65 IN | HEART RATE: 63 BPM | SYSTOLIC BLOOD PRESSURE: 120 MMHG | OXYGEN SATURATION: 95 % | WEIGHT: 165 LBS | TEMPERATURE: 98 F | DIASTOLIC BLOOD PRESSURE: 80 MMHG | BODY MASS INDEX: 27.49 KG/M2 | RESPIRATION RATE: 20 BRPM

## 2024-10-23 DIAGNOSIS — Z87.891 PERSONAL HISTORY OF NICOTINE DEPENDENCE: ICD-10-CM

## 2024-10-23 DIAGNOSIS — Z85.118 PERSONAL HISTORY OF OTHER MALIGNANT NEOPLASM OF BRONCHUS AND LUNG: ICD-10-CM

## 2024-10-23 DIAGNOSIS — J45.40 MODERATE PERSISTENT ASTHMA WITHOUT COMPLICATION: Primary | ICD-10-CM

## 2024-10-23 DIAGNOSIS — J84.9 INTERSTITIAL PULMONARY DISEASE, UNSPECIFIED (HCC): ICD-10-CM

## 2024-10-23 DIAGNOSIS — G47.34 NOCTURNAL HYPOXIA: ICD-10-CM

## 2024-10-23 DIAGNOSIS — Z90.2 S/P LOBECTOMY OF LUNG: ICD-10-CM

## 2024-10-23 PROCEDURE — 1123F ACP DISCUSS/DSCN MKR DOCD: CPT | Performed by: INTERNAL MEDICINE

## 2024-10-23 PROCEDURE — G8427 DOCREV CUR MEDS BY ELIG CLIN: HCPCS | Performed by: INTERNAL MEDICINE

## 2024-10-23 PROCEDURE — 3074F SYST BP LT 130 MM HG: CPT | Performed by: INTERNAL MEDICINE

## 2024-10-23 PROCEDURE — G2211 COMPLEX E/M VISIT ADD ON: HCPCS | Performed by: INTERNAL MEDICINE

## 2024-10-23 PROCEDURE — G8484 FLU IMMUNIZE NO ADMIN: HCPCS | Performed by: INTERNAL MEDICINE

## 2024-10-23 PROCEDURE — 3079F DIAST BP 80-89 MM HG: CPT | Performed by: INTERNAL MEDICINE

## 2024-10-23 PROCEDURE — 1126F AMNT PAIN NOTED NONE PRSNT: CPT | Performed by: INTERNAL MEDICINE

## 2024-10-23 PROCEDURE — G8417 CALC BMI ABV UP PARAM F/U: HCPCS | Performed by: INTERNAL MEDICINE

## 2024-10-23 PROCEDURE — 1090F PRES/ABSN URINE INCON ASSESS: CPT | Performed by: INTERNAL MEDICINE

## 2024-10-23 PROCEDURE — 99214 OFFICE O/P EST MOD 30 MIN: CPT | Performed by: INTERNAL MEDICINE

## 2024-10-23 PROCEDURE — 1159F MED LIST DOCD IN RCRD: CPT | Performed by: INTERNAL MEDICINE

## 2024-10-23 PROCEDURE — G8399 PT W/DXA RESULTS DOCUMENT: HCPCS | Performed by: INTERNAL MEDICINE

## 2024-10-23 PROCEDURE — 1160F RVW MEDS BY RX/DR IN RCRD: CPT | Performed by: INTERNAL MEDICINE

## 2024-10-23 PROCEDURE — 1036F TOBACCO NON-USER: CPT | Performed by: INTERNAL MEDICINE

## 2024-10-23 ASSESSMENT — ASTHMA QUESTIONNAIRES
QUESTION_3 LAST FOUR WEEKS HOW OFTEN DID YOUR ASTHMA SYMPTOMS (WHEEZING, COUGHING, SHORTNESS OF BREATH, CHEST TIGHTNESS OR PAIN) WAKE YOU UP AT NIGHT OR EARLIER THAN USUAL IN THE MORNING: NOT AT ALL
QUESTION_2 LAST FOUR WEEKS HOW OFTEN HAVE YOU HAD SHORTNESS OF BREATH: ONCE OR TWICE A WEEK
QUESTION_1 LAST FOUR WEEKS HOW MUCH OF THE TIME DID YOUR ASTHMA KEEP YOU FROM GETTING AS MUCH DONE AT WORK, SCHOOL OR AT HOME: NONE OF THE TIME
ACT_TOTALSCORE: 23
QUESTION_5 LAST FOUR WEEKS HOW WOULD YOU RATE YOUR ASTHMA CONTROL: WELL CONTROLLED
QUESTION_4 LAST FOUR WEEKS HOW OFTEN HAVE YOU USED YOUR RESCUE INHALER OR NEBULIZER MEDICATION (SUCH AS ALBUTEROL): NOT AT ALL

## 2024-10-23 NOTE — PROGRESS NOTES
Spirometry:             Date:     09/12/2019   7/15/21                 FVC     2.58-86   2.7-90                 FEV1     2.06-93   2.0-91                 FEV1/FVC     80   74                 FEF 25-75%     2.   1.49-86                 Bronchodilator Response                         TLC        5.                 RV        3.                 DLCO        2.           FeNO: No results found for this or any previous visit.  FeNO and Likelihood of Eosinophilic Asthma   Unlikely Intermediate Likely   <25 ppb 25-50 ppb >50ppb   Exercise Oximetry:  1/26/24  Resting RA sat - 98%  Lowest ambulating RA Sat - 91%    Echo: TRANSTHORACIC ECHOCARDIOGRAM (TTE) COMPLETE (CONTRAST/BUBBLE/3D PRN) 03/24/2023    Interpretation Summary    Left Ventricle: Normal left ventricular systolic function. EF by 2D Simpsons Biplane is 58%. Left ventricle size is normal. Mildly increased wall thickness. Findings consistent with mild concentric hypertrophy. Normal wall motion. Abnormal diastolic function. Average E/e' ratio is 13.71.    Aortic Valve: Mildly thickened cusp.    Mitral Valve: Mild to moderate regurgitation with multiple jets.    Tricuspid Valve: The estimated RVSP is 27 mmHg.    Pulmonic Valve: Moderate regurgitation.    Left Atrium: Left atrium is severely dilated. LA Vol Index is  49 ml/m2.      [facility] Reference Info:                                                                                                                    Past Medical History:   Diagnosis Date    Basal cell carcinoma 2001    Diabetes (HCC)     patient denies, states prediabetic    Esophageal dysphagia     Followed by GI, EGD with dilation scheduled    Esophageal reflux     Fibromyalgia     Gastroesophageal reflux disease without esophagitis 8/10/2016    Generalized osteoarthrosis, involving multiple sites     GERD (gastroesophageal reflux disease)     managed with medication    History of syncope     loop recorder in place, pt followed by

## 2025-04-21 ENCOUNTER — CLINICAL SUPPORT (OUTPATIENT)
Dept: PULMONOLOGY | Age: 80
End: 2025-04-21
Payer: MEDICARE

## 2025-04-21 DIAGNOSIS — J84.9 ILD (INTERSTITIAL LUNG DISEASE) (HCC): Primary | ICD-10-CM

## 2025-04-21 LAB
FEF25-27, POC: 0.94 L/S
FET, POC: NORMAL
FEV 1 , POC: 1.66 L
FEV1/FVC, POC: NORMAL
FVC, POC: NORMAL
LUNG AGE, POC: NORMAL
PEF, POC: 4.78 L/S

## 2025-04-21 PROCEDURE — 94729 DIFFUSING CAPACITY: CPT | Performed by: INTERNAL MEDICINE

## 2025-04-21 PROCEDURE — 94726 PLETHYSMOGRAPHY LUNG VOLUMES: CPT | Performed by: INTERNAL MEDICINE

## 2025-04-21 PROCEDURE — 94010 BREATHING CAPACITY TEST: CPT | Performed by: INTERNAL MEDICINE

## 2025-04-28 ENCOUNTER — RESULTS FOLLOW-UP (OUTPATIENT)
Dept: PULMONOLOGY | Age: 80
End: 2025-04-28

## 2025-04-29 ASSESSMENT — ASTHMA QUESTIONNAIRES: ACT_TOTALSCORE: 23

## 2025-05-07 ASSESSMENT — ASTHMA QUESTIONNAIRES: ACT_TOTALSCORE: 23

## 2025-05-12 ENCOUNTER — HOSPITAL ENCOUNTER (OUTPATIENT)
Dept: GENERAL RADIOLOGY | Age: 80
Discharge: HOME | End: 2025-05-14
Payer: MEDICARE

## 2025-05-12 ENCOUNTER — OFFICE VISIT (OUTPATIENT)
Dept: PULMONOLOGY | Age: 80
End: 2025-05-12
Payer: MEDICARE

## 2025-05-12 VITALS
BODY MASS INDEX: 27.49 KG/M2 | DIASTOLIC BLOOD PRESSURE: 80 MMHG | HEIGHT: 65 IN | WEIGHT: 165 LBS | OXYGEN SATURATION: 96 % | TEMPERATURE: 98 F | RESPIRATION RATE: 20 BRPM | SYSTOLIC BLOOD PRESSURE: 140 MMHG | HEART RATE: 63 BPM

## 2025-05-12 DIAGNOSIS — J84.9 ILD (INTERSTITIAL LUNG DISEASE) (HCC): Primary | ICD-10-CM

## 2025-05-12 DIAGNOSIS — J84.9 INTERSTITIAL PULMONARY DISEASE, UNSPECIFIED (HCC): ICD-10-CM

## 2025-05-12 DIAGNOSIS — Z90.2 S/P LOBECTOMY OF LUNG: ICD-10-CM

## 2025-05-12 DIAGNOSIS — J45.40 MODERATE PERSISTENT ASTHMA WITHOUT COMPLICATION: ICD-10-CM

## 2025-05-12 DIAGNOSIS — Z85.118 HISTORY OF LUNG CANCER: ICD-10-CM

## 2025-05-12 DIAGNOSIS — Z85.118 PERSONAL HISTORY OF OTHER MALIGNANT NEOPLASM OF BRONCHUS AND LUNG: ICD-10-CM

## 2025-05-12 PROCEDURE — G2211 COMPLEX E/M VISIT ADD ON: HCPCS | Performed by: INTERNAL MEDICINE

## 2025-05-12 PROCEDURE — 3079F DIAST BP 80-89 MM HG: CPT | Performed by: INTERNAL MEDICINE

## 2025-05-12 PROCEDURE — G8427 DOCREV CUR MEDS BY ELIG CLIN: HCPCS | Performed by: INTERNAL MEDICINE

## 2025-05-12 PROCEDURE — G8399 PT W/DXA RESULTS DOCUMENT: HCPCS | Performed by: INTERNAL MEDICINE

## 2025-05-12 PROCEDURE — 1159F MED LIST DOCD IN RCRD: CPT | Performed by: INTERNAL MEDICINE

## 2025-05-12 PROCEDURE — 1036F TOBACCO NON-USER: CPT | Performed by: INTERNAL MEDICINE

## 2025-05-12 PROCEDURE — 1160F RVW MEDS BY RX/DR IN RCRD: CPT | Performed by: INTERNAL MEDICINE

## 2025-05-12 PROCEDURE — 1090F PRES/ABSN URINE INCON ASSESS: CPT | Performed by: INTERNAL MEDICINE

## 2025-05-12 PROCEDURE — 3077F SYST BP >= 140 MM HG: CPT | Performed by: INTERNAL MEDICINE

## 2025-05-12 PROCEDURE — G8417 CALC BMI ABV UP PARAM F/U: HCPCS | Performed by: INTERNAL MEDICINE

## 2025-05-12 PROCEDURE — 71046 X-RAY EXAM CHEST 2 VIEWS: CPT

## 2025-05-12 PROCEDURE — 1123F ACP DISCUSS/DSCN MKR DOCD: CPT | Performed by: INTERNAL MEDICINE

## 2025-05-12 PROCEDURE — 99214 OFFICE O/P EST MOD 30 MIN: CPT | Performed by: INTERNAL MEDICINE

## 2025-05-12 NOTE — PROGRESS NOTES
Name:  Poppy Morgan  YOB: 1945   MRN: [id number]      Office Visit: 5/12/2025        ASSESSMENT AND PLAN:  (Medical Decision Making)    Impression: Pt with h/o lung adenocarcinoma s/p RULobectomy Sept 2017. No signs of recurrence, completed 5 years of annual CT scans. No longer qualifies for ongoing CT scans.     ILD - minimal ILD at periphery of both lungs. cPFT's showed mild obstruction.  6MW without desaturation.  Repeated walking sat check 10/23/24 with lowest sat 91%.   Reports ongoing limiting MACKEY and inability to walk 100ft to go from parking lot to store.   Minimal smoking history, suspect this is more asthmatic and COPD in nature.  Cough is better with improvement in her GERD control.  Not currently on any inhaled therapy, insurance would not cover Arnuity. Not interested in trying additional inhalers at present.   On 3L O2 at night.  Completed pulmonary rehab with some improvement in dyspnea on exertion.  Clinically doing well.  Reviewed repeat complete PFTs generally with good values but a reduction in diffusion capacity to the 60s which was previously normal.    -Will repeat complete PFTs in 6 months at her next appointment.  If diffusion capacity continues to be reduced we will repeat an echocardiogram to ensure there are no new cardiovascular changes.  But she previously had mild to moderate mitral regurgitation.  -Continue oxygen 3 L with sleep.  -annual CXR. Reviewed today's CXR with her that appears stable and will follow up final read.   -Otherwise does not qualify for ongoing CT scans of the chest.      Follow-up in 6 months    1. ILD (interstitial lung disease) (HCC)    2. Moderate persistent asthma without complication    3. Personal history of other malignant neoplasm of bronchus and lung    4. S/P lobectomy of lung    5. History of lung cancer        No orders of the defined types were placed in this encounter.      No orders of the defined types were placed in this

## 2025-08-07 ENCOUNTER — TELEPHONE (OUTPATIENT)
Dept: PULMONOLOGY | Age: 80
End: 2025-08-07

## (undated) DEVICE — FORCEPS BX WRK L110MM CHN L2MM DIA1.7MM SUPERDIMENSION

## (undated) DEVICE — UNIVERSAL FIXATION CANNULA: Brand: VERSAONE

## (undated) DEVICE — LOGICUT SCISSOR LENGTH 320MM: Brand: LOGI - LAPAROSCOPIC INSTRUMENT SYSTEM

## (undated) DEVICE — DRAPE TWL SURG 16X26IN BLU ORB04] ALLCARE INC]

## (undated) DEVICE — BRONCHOSCOPY PACK: Brand: MEDLINE INDUSTRIES, INC.

## (undated) DEVICE — CANNULA NSL ORAL AD FOR CAPNOFLEX CO2 O2 AIRLFE

## (undated) DEVICE — (D)SPONGE DISECT ENDOSCP KTTNR -- DISC BY MFR

## (undated) DEVICE — SINGLE PORT MANIFOLD: Brand: NEPTUNE 2

## (undated) DEVICE — 2000CC GUARDIAN II: Brand: GUARDIAN

## (undated) DEVICE — AGENT HEMSTAT W2XL14IN OXIDIZED REGENERATED CELOS ABSRB FOR

## (undated) DEVICE — KENDALL SCD EXPRESS SLEEVES, KNEE LENGTH, MEDIUM: Brand: KENDALL SCD

## (undated) DEVICE — ESOPHAGEAL BALLOON DILATATION CATHETER: Brand: CRE FIXED WIRE

## (undated) DEVICE — SOLUTION IRRIG 3000ML 0.9% SOD CHL FLX CONT 0797208] ICU MEDICAL INC]

## (undated) DEVICE — CATHETER THOR 32FR L23IN PVC 6 EYELET STR ATRAUM

## (undated) DEVICE — ENDOSCOPIC KIT 1.1+ OP4 CA DE 2 GWN AAMI LEVEL 3

## (undated) DEVICE — (D)SYR 10ML 1/5ML GRAD NSAF -- PKGING CHANGE USE ITEM 338027

## (undated) DEVICE — ARTICULATING RELOAD WITH TRI-STAPLE TECHNOLOGY: Brand: ENDO GIA

## (undated) DEVICE — BLADELESS OPTICAL TROCAR WITH FIXATION CANNULA: Brand: VERSAONE

## (undated) DEVICE — (D)PREP SKN CHLRAPRP APPL 26ML -- CONVERT TO ITEM 371833

## (undated) DEVICE — Device: Brand: BALLOON

## (undated) DEVICE — CONNECTOR TBNG OD5-7MM O2 END DISP

## (undated) DEVICE — Device

## (undated) DEVICE — KENDALL RADIOLUCENT FOAM MONITORING ELECTRODE RECTANGULAR SHAPE: Brand: KENDALL

## (undated) DEVICE — AIRLIFE™ OXYGEN TUBING 7 FEET (2.1 M) CRUSH RESISTANT OXYGEN TUBING, VINYL TIPPED: Brand: AIRLIFE™

## (undated) DEVICE — NEEDLE HYPO 25GA L1.5IN BLU POLYPR HUB S STL REG BVL STR

## (undated) DEVICE — SLIM BODY SKIN STAPLER: Brand: APPOSE ULC

## (undated) DEVICE — CATHETER THOR 32FR L23IN PVC 5 EYELET STR ATRAUM

## (undated) DEVICE — PAD,NON-ADHERENT,3X8,STERILE,LF,1/PK: Brand: MEDLINE

## (undated) DEVICE — INTENDED FOR TISSUE SEPARATION, AND OTHER PROCEDURES THAT REQUIRE A SHARP SURGICAL BLADE TO PUNCTURE OR CUT.: Brand: BARD-PARKER SAFETY BLADES SIZE 15, STERILE

## (undated) DEVICE — SUTURE PERMAHAND SZ 0 L30IN NONABSORBABLE BLK L30MM PSL 3/8 590H

## (undated) DEVICE — BLACK ARTICULATING RELOAD WITH TRI-STAPLE TECHNOLOGY: Brand: ENDO GIA

## (undated) DEVICE — FORCEPS BX L240CM JAW DIA2.8MM L CAP W/ NDL MIC MESH TOOTH

## (undated) DEVICE — SUTURE VCRL SZ 0 L36IN ABSRB UD L36MM CT-1 1/2 CIR J946H

## (undated) DEVICE — UNIVERSAL STAPLER: Brand: ENDO GIA ULTRA

## (undated) DEVICE — TRAY CATH 16F URIN MTR LTX -- CONVERT TO ITEM 363111

## (undated) DEVICE — SINGLE USE BIOPSY VALVE MAJ-210: Brand: SINGLE USE BIOPSY VALVE (STERILE)

## (undated) DEVICE — SET EXTN L6IN W/ S STL CLMP

## (undated) DEVICE — RELOAD STPL 35MM THN VASC TISS WHT ENDOPATH ECHELON

## (undated) DEVICE — GAUZE,SPONGE,4"X4",12PLY,WOVEN,NS,LF: Brand: MEDLINE

## (undated) DEVICE — CONTAINER FORMALIN PREFILLED 10% NBF 60ML

## (undated) DEVICE — SOL INJ SOD CL0.9% 10ML PREFIL --

## (undated) DEVICE — LAPSAC SURGICAL TISSUE POUCH: Brand: LAPSAC

## (undated) DEVICE — GOWN,REINF,POLY,ECL,PP SLV,XL: Brand: MEDLINE

## (undated) DEVICE — SPONGE LAP 18X18IN STRL -- 5/PK

## (undated) DEVICE — SOL ANTI-FOG 6ML MEDC -- MEDICHOICE - CONVERT TO 358427

## (undated) DEVICE — SINGLE USE ASPIRATION NEEDLE: Brand: SINGLE USE ASPIRATION NEEDLE

## (undated) DEVICE — MOUTHPIECE ENDOSCP L CTRL OPN AND SIDE PORTS DISP

## (undated) DEVICE — REM POLYHESIVE ADULT PATIENT RETURN ELECTRODE: Brand: VALLEYLAB

## (undated) DEVICE — LAP CHOLE: Brand: MEDLINE INDUSTRIES, INC.

## (undated) DEVICE — DEVICE INFLATION 60 CC INFLATORY

## (undated) DEVICE — [HIGH FLOW INSUFFLATOR,  DO NOT USE IF PACKAGE IS DAMAGED,  KEEP DRY,  KEEP AWAY FROM SUNLIGHT,  PROTECT FROM HEAT AND RADIOACTIVE SOURCES.]: Brand: PNEUMOSURE

## (undated) DEVICE — RELOAD STPL 45MM THCK TISS GRN W/ GRIPPING SURF TECHNOLOGY

## (undated) DEVICE — KIT PROC W/ 90DEG FIRM TIP EXT WRK CHN LOCATABLE GUID FOR

## (undated) DEVICE — DRSG GZ PETROLATM CURAD 1/2X72 --

## (undated) DEVICE — PATCH SENS PT FOR ELECTROMAGNETIC NAVIGATION BRONCHSCP SYS

## (undated) DEVICE — GOWN,BREATHABLE SLV,AURORA,LG,STRL: Brand: MEDLINE

## (undated) DEVICE — 3M™ STERI-DRAPE™ INSTRUMENT POUCH 1018: Brand: STERI-DRAPE™

## (undated) DEVICE — BLOCK BITE AD 60FR W/ VELC STRP ADDRESSES MOST PT AND

## (undated) DEVICE — BALLOON US E LIN RNG O KT FOR FG-32UA

## (undated) DEVICE — 2, DISPOSABLE SUCTION/IRRIGATOR WITHOUT DISPOSABLE TIP: Brand: STRYKEFLOW

## (undated) DEVICE — BLADELESS OPTICAL TROCAR WITH FIXATION CANNULA: Brand: VERSAPORT

## (undated) DEVICE — VINYL URETHRAL CATHETER: Brand: DOVER

## (undated) DEVICE — INTENDED FOR TISSUE SEPARATION, AND OTHER PROCEDURES THAT REQUIRE A SHARP SURGICAL BLADE TO PUNCTURE OR CUT.: Brand: BARD-PARKER SAFETY BLADES SIZE 11, STERILE

## (undated) DEVICE — AMD ANTIMICROBIAL GAUZE SPONGES,12 PLY USP TYPE VII, 0.2% POLYHEXAMETHYLENE BIGUANIDE HCI (PHMB): Brand: CURITY

## (undated) DEVICE — SINGLE USE SUCTION VALVE MAJ-209: Brand: SINGLE USE SUCTION VALVE (STERILE)